# Patient Record
Sex: FEMALE | Race: BLACK OR AFRICAN AMERICAN | NOT HISPANIC OR LATINO | Employment: UNEMPLOYED | URBAN - METROPOLITAN AREA
[De-identification: names, ages, dates, MRNs, and addresses within clinical notes are randomized per-mention and may not be internally consistent; named-entity substitution may affect disease eponyms.]

---

## 2019-06-16 ENCOUNTER — HOSPITAL ENCOUNTER (INPATIENT)
Facility: HOSPITAL | Age: 59
LOS: 1 days | Discharge: HOME | DRG: 066 | End: 2019-06-17
Attending: EMERGENCY MEDICINE | Admitting: PSYCHIATRY & NEUROLOGY
Payer: MEDICAID

## 2019-06-16 DIAGNOSIS — I63.9 ACUTE ISCHEMIC STROKE: ICD-10-CM

## 2019-06-16 DIAGNOSIS — I63.311 CEREBROVASCULAR ACCIDENT (CVA) DUE TO THROMBOSIS OF RIGHT MIDDLE CEREBRAL ARTERY: Primary | ICD-10-CM

## 2019-06-16 PROBLEM — R29.810 FACIAL DROOP: Status: ACTIVE | Noted: 2019-06-16

## 2019-06-16 PROBLEM — I10 HYPERTENSION, ESSENTIAL: Status: ACTIVE | Noted: 2019-06-16

## 2019-06-16 PROBLEM — R47.1 DYSARTHRIA: Status: ACTIVE | Noted: 2019-06-16

## 2019-06-16 LAB
ALBUMIN SERPL BCP-MCNC: 4.1 G/DL (ref 3.5–5.2)
ALP SERPL-CCNC: 159 U/L (ref 55–135)
ALT SERPL W/O P-5'-P-CCNC: 17 U/L (ref 10–44)
ANION GAP SERPL CALC-SCNC: 14 MMOL/L (ref 8–16)
APTT BLDCRRT: 21.4 SEC (ref 21–32)
ASCENDING AORTA: 3.3 CM
AST SERPL-CCNC: 23 U/L (ref 10–40)
AV INDEX (PROSTH): 0.99
AV MEAN GRADIENT: 2.47 MMHG
AV PEAK GRADIENT: 4.67 MMHG
AV VALVE AREA: 2.98 CM2
AV VELOCITY RATIO: 0.9
BASOPHILS # BLD AUTO: 0.06 K/UL (ref 0–0.2)
BASOPHILS NFR BLD: 0.5 % (ref 0–1.9)
BILIRUB SERPL-MCNC: 0.3 MG/DL (ref 0.1–1)
BILIRUB UR QL STRIP: NEGATIVE
BSA FOR ECHO PROCEDURE: 1.97 M2
BUN SERPL-MCNC: 12 MG/DL (ref 6–20)
CALCIUM SERPL-MCNC: 10 MG/DL (ref 8.7–10.5)
CHLORIDE SERPL-SCNC: 101 MMOL/L (ref 95–110)
CHOLEST SERPL-MCNC: 240 MG/DL (ref 120–199)
CHOLEST/HDLC SERPL: 5 {RATIO} (ref 2–5)
CK MB SERPL-MCNC: 0.7 NG/ML (ref 0.1–6.5)
CK MB SERPL-RTO: 0.8 % (ref 0–5)
CK SERPL-CCNC: 86 U/L (ref 20–180)
CLARITY UR REFRACT.AUTO: CLEAR
CO2 SERPL-SCNC: 24 MMOL/L (ref 23–29)
COLOR UR AUTO: YELLOW
CREAT SERPL-MCNC: 0.7 MG/DL (ref 0.5–1.4)
CREAT SERPL-MCNC: 0.8 MG/DL (ref 0.5–1.4)
CV ECHO LV RWT: 0.39 CM
DIFFERENTIAL METHOD: ABNORMAL
DOP CALC AO PEAK VEL: 1.08 M/S
DOP CALC AO VTI: 17.62 CM
DOP CALC LVOT AREA: 3.02 CM2
DOP CALC LVOT DIAMETER: 1.96 CM
DOP CALC LVOT PEAK VEL: 0.97 M/S
DOP CALC LVOT STROKE VOLUME: 52.59 CM3
DOP CALCLVOT PEAK VEL VTI: 17.44 CM
E WAVE DECELERATION TIME: 206.33 MSEC
E/A RATIO: 1.11
E/E' RATIO: 8.21
ECHO LV POSTERIOR WALL: 0.78 CM (ref 0.6–1.1)
EOSINOPHIL # BLD AUTO: 0.4 K/UL (ref 0–0.5)
EOSINOPHIL NFR BLD: 3.3 % (ref 0–8)
ERYTHROCYTE [DISTWIDTH] IN BLOOD BY AUTOMATED COUNT: 14.4 % (ref 11.5–14.5)
EST. GFR  (AFRICAN AMERICAN): >60 ML/MIN/1.73 M^2
EST. GFR  (NON AFRICAN AMERICAN): >60 ML/MIN/1.73 M^2
ESTIMATED AVG GLUCOSE: 169 MG/DL (ref 68–131)
FRACTIONAL SHORTENING: 34 % (ref 28–44)
GLUCOSE SERPL-MCNC: 154 MG/DL (ref 70–110)
GLUCOSE UR QL STRIP: NEGATIVE
HBA1C MFR BLD HPLC: 7.5 % (ref 4–5.6)
HCT VFR BLD AUTO: 38.9 % (ref 37–48.5)
HDLC SERPL-MCNC: 48 MG/DL (ref 40–75)
HDLC SERPL: 20 % (ref 20–50)
HGB BLD-MCNC: 12.5 G/DL (ref 12–16)
HGB UR QL STRIP: NEGATIVE
IMM GRANULOCYTES # BLD AUTO: 0.02 K/UL (ref 0–0.04)
IMM GRANULOCYTES NFR BLD AUTO: 0.2 % (ref 0–0.5)
INR PPP: 0.9 (ref 0.8–1.2)
INR PPP: 1 (ref 0.8–1.2)
INTERVENTRICULAR SEPTUM: 0.74 CM (ref 0.6–1.1)
IVRT: 0.1 MSEC
KETONES UR QL STRIP: NEGATIVE
LA MAJOR: 5.28 CM
LA MINOR: 5.3 CM
LA WIDTH: 3.82 CM
LDLC SERPL CALC-MCNC: 149.4 MG/DL (ref 63–159)
LEFT ATRIUM SIZE: 3.42 CM
LEFT ATRIUM VOLUME INDEX: 30.7 ML/M2
LEFT ATRIUM VOLUME: 58.74 CM3
LEFT INTERNAL DIMENSION IN SYSTOLE: 2.63 CM (ref 2.1–4)
LEFT VENTRICLE DIASTOLIC VOLUME INDEX: 35.95 ML/M2
LEFT VENTRICLE DIASTOLIC VOLUME: 68.8 ML
LEFT VENTRICLE MASS INDEX: 45 G/M2
LEFT VENTRICLE SYSTOLIC VOLUME INDEX: 13.2 ML/M2
LEFT VENTRICLE SYSTOLIC VOLUME: 25.25 ML
LEFT VENTRICULAR INTERNAL DIMENSION IN DIASTOLE: 3.97 CM (ref 3.5–6)
LEFT VENTRICULAR MASS: 86.21 G
LEUKOCYTE ESTERASE UR QL STRIP: NEGATIVE
LV LATERAL E/E' RATIO: 7.8
LV SEPTAL E/E' RATIO: 8.67
LYMPHOCYTES # BLD AUTO: 6.2 K/UL (ref 1–4.8)
LYMPHOCYTES NFR BLD: 54.7 % (ref 18–48)
MCH RBC QN AUTO: 26.9 PG (ref 27–31)
MCHC RBC AUTO-ENTMCNC: 32.1 G/DL (ref 32–36)
MCV RBC AUTO: 84 FL (ref 82–98)
MONOCYTES # BLD AUTO: 0.9 K/UL (ref 0.3–1)
MONOCYTES NFR BLD: 7.7 % (ref 4–15)
MV PEAK A VEL: 0.7 M/S
MV PEAK E VEL: 0.78 M/S
NEUTROPHILS # BLD AUTO: 3.8 K/UL (ref 1.8–7.7)
NEUTROPHILS NFR BLD: 33.6 % (ref 38–73)
NITRITE UR QL STRIP: NEGATIVE
NONHDLC SERPL-MCNC: 192 MG/DL
NRBC BLD-RTO: 0 /100 WBC
PH UR STRIP: 7 [PH] (ref 5–8)
PISA TR MAX VEL: 1.69 M/S
PLATELET # BLD AUTO: 342 K/UL (ref 150–350)
PMV BLD AUTO: 11.3 FL (ref 9.2–12.9)
POC PTINR: 0.9 (ref 0.9–1.2)
POC PTWBT: 11.4 SEC (ref 9.7–14.3)
POCT GLUCOSE: 119 MG/DL (ref 70–110)
POCT GLUCOSE: 119 MG/DL (ref 70–110)
POCT GLUCOSE: 150 MG/DL (ref 70–110)
POTASSIUM SERPL-SCNC: 3.9 MMOL/L (ref 3.5–5.1)
PROT SERPL-MCNC: 8.8 G/DL (ref 6–8.4)
PROT UR QL STRIP: NEGATIVE
PROTHROMBIN TIME: 10.1 SEC (ref 9–12.5)
PROTHROMBIN TIME: 9.9 SEC (ref 9–12.5)
PULM VEIN S/D RATIO: 1.79
PV PEAK D VEL: 0.24 M/S
PV PEAK S VEL: 0.43 M/S
RA MAJOR: 4.4 CM
RA PRESSURE: 3 MMHG
RA WIDTH: 4.11 CM
RBC # BLD AUTO: 4.65 M/UL (ref 4–5.4)
RV TISSUE DOPPLER FREE WALL SYSTOLIC VELOCITY 1 (APICAL 4 CHAMBER VIEW): 12.7 M/S
SAMPLE: NORMAL
SAMPLE: NORMAL
SINUS: 2.95 CM
SODIUM SERPL-SCNC: 139 MMOL/L (ref 136–145)
SP GR UR STRIP: 1.01 (ref 1–1.03)
STJ: 2.88 CM
TDI LATERAL: 0.1
TDI SEPTAL: 0.09
TDI: 0.1
TR MAX PG: 11.42 MMHG
TRICUSPID ANNULAR PLANE SYSTOLIC EXCURSION: 2.18 CM
TRIGL SERPL-MCNC: 213 MG/DL (ref 30–150)
TROPONIN I SERPL DL<=0.01 NG/ML-MCNC: <0.006 NG/ML (ref 0–0.03)
TSH SERPL DL<=0.005 MIU/L-ACNC: 2.86 UIU/ML (ref 0.4–4)
TV REST PULMONARY ARTERY PRESSURE: 14 MMHG
URN SPEC COLLECT METH UR: NORMAL
WBC # BLD AUTO: 11.37 K/UL (ref 3.9–12.7)

## 2019-06-16 PROCEDURE — 99223 PR INITIAL HOSPITAL CARE,LEVL III: ICD-10-PCS | Mod: ,,, | Performed by: PSYCHIATRY & NEUROLOGY

## 2019-06-16 PROCEDURE — 93010 ELECTROCARDIOGRAM REPORT: CPT | Mod: ,,, | Performed by: INTERNAL MEDICINE

## 2019-06-16 PROCEDURE — 20600001 HC STEP DOWN PRIVATE ROOM

## 2019-06-16 PROCEDURE — 85610 PROTHROMBIN TIME: CPT

## 2019-06-16 PROCEDURE — 80053 COMPREHEN METABOLIC PANEL: CPT

## 2019-06-16 PROCEDURE — 82550 ASSAY OF CK (CPK): CPT

## 2019-06-16 PROCEDURE — 82565 ASSAY OF CREATININE: CPT

## 2019-06-16 PROCEDURE — 84484 ASSAY OF TROPONIN QUANT: CPT

## 2019-06-16 PROCEDURE — 63600175 PHARM REV CODE 636 W HCPCS: Performed by: NURSE PRACTITIONER

## 2019-06-16 PROCEDURE — 96374 THER/PROPH/DIAG INJ IV PUSH: CPT

## 2019-06-16 PROCEDURE — 99223 1ST HOSP IP/OBS HIGH 75: CPT | Mod: ,,, | Performed by: PSYCHIATRY & NEUROLOGY

## 2019-06-16 PROCEDURE — 81003 URINALYSIS AUTO W/O SCOPE: CPT

## 2019-06-16 PROCEDURE — 93010 EKG 12-LEAD: ICD-10-PCS | Mod: ,,, | Performed by: INTERNAL MEDICINE

## 2019-06-16 PROCEDURE — 25500020 PHARM REV CODE 255: Performed by: EMERGENCY MEDICINE

## 2019-06-16 PROCEDURE — 84443 ASSAY THYROID STIM HORMONE: CPT

## 2019-06-16 PROCEDURE — 99291 PR CRITICAL CARE, E/M 30-74 MINUTES: ICD-10-PCS | Mod: ,,, | Performed by: EMERGENCY MEDICINE

## 2019-06-16 PROCEDURE — 25000003 PHARM REV CODE 250: Performed by: PHYSICIAN ASSISTANT

## 2019-06-16 PROCEDURE — 25000003 PHARM REV CODE 250: Performed by: NURSE PRACTITIONER

## 2019-06-16 PROCEDURE — 99291 CRITICAL CARE FIRST HOUR: CPT | Mod: ,,, | Performed by: EMERGENCY MEDICINE

## 2019-06-16 PROCEDURE — 82553 CREATINE MB FRACTION: CPT

## 2019-06-16 PROCEDURE — 85610 PROTHROMBIN TIME: CPT | Mod: 91

## 2019-06-16 PROCEDURE — 99291 CRITICAL CARE FIRST HOUR: CPT | Mod: 25

## 2019-06-16 PROCEDURE — 80061 LIPID PANEL: CPT

## 2019-06-16 PROCEDURE — 83036 HEMOGLOBIN GLYCOSYLATED A1C: CPT

## 2019-06-16 PROCEDURE — 85025 COMPLETE CBC W/AUTO DIFF WBC: CPT

## 2019-06-16 PROCEDURE — 85730 THROMBOPLASTIN TIME PARTIAL: CPT

## 2019-06-16 PROCEDURE — 92610 EVALUATE SWALLOWING FUNCTION: CPT

## 2019-06-16 PROCEDURE — 82803 BLOOD GASES ANY COMBINATION: CPT

## 2019-06-16 PROCEDURE — 99900035 HC TECH TIME PER 15 MIN (STAT)

## 2019-06-16 PROCEDURE — 93005 ELECTROCARDIOGRAM TRACING: CPT

## 2019-06-16 RX ORDER — ASPIRIN 81 MG/1
81 TABLET ORAL DAILY
Status: DISCONTINUED | OUTPATIENT
Start: 2019-06-16 | End: 2019-06-17 | Stop reason: HOSPADM

## 2019-06-16 RX ORDER — ACETAMINOPHEN 325 MG/1
650 TABLET ORAL EVERY 6 HOURS PRN
Status: DISCONTINUED | OUTPATIENT
Start: 2019-06-16 | End: 2019-06-17 | Stop reason: HOSPADM

## 2019-06-16 RX ORDER — AMLODIPINE BESYLATE 10 MG/1
10 TABLET ORAL DAILY
COMMUNITY

## 2019-06-16 RX ORDER — SODIUM CHLORIDE 0.9 % (FLUSH) 0.9 %
10 SYRINGE (ML) INJECTION
Status: DISCONTINUED | OUTPATIENT
Start: 2019-06-16 | End: 2019-06-17 | Stop reason: HOSPADM

## 2019-06-16 RX ORDER — AMLODIPINE BESYLATE 10 MG/1
10 TABLET ORAL DAILY
Status: DISCONTINUED | OUTPATIENT
Start: 2019-06-16 | End: 2019-06-17 | Stop reason: HOSPADM

## 2019-06-16 RX ORDER — POLYETHYLENE GLYCOL 3350 17 G/17G
17 POWDER, FOR SOLUTION ORAL DAILY PRN
Status: DISCONTINUED | OUTPATIENT
Start: 2019-06-16 | End: 2019-06-17 | Stop reason: HOSPADM

## 2019-06-16 RX ORDER — MIDAZOLAM HYDROCHLORIDE 1 MG/ML
1 INJECTION INTRAMUSCULAR; INTRAVENOUS
Status: COMPLETED | OUTPATIENT
Start: 2019-06-16 | End: 2019-06-16

## 2019-06-16 RX ORDER — CLOPIDOGREL BISULFATE 75 MG/1
75 TABLET ORAL DAILY
Status: DISCONTINUED | OUTPATIENT
Start: 2019-06-16 | End: 2019-06-17 | Stop reason: HOSPADM

## 2019-06-16 RX ORDER — ONDANSETRON 8 MG/1
8 TABLET, ORALLY DISINTEGRATING ORAL EVERY 8 HOURS PRN
Status: DISCONTINUED | OUTPATIENT
Start: 2019-06-16 | End: 2019-06-17 | Stop reason: HOSPADM

## 2019-06-16 RX ORDER — ATORVASTATIN CALCIUM 20 MG/1
40 TABLET, FILM COATED ORAL DAILY
Status: DISCONTINUED | OUTPATIENT
Start: 2019-06-16 | End: 2019-06-17 | Stop reason: HOSPADM

## 2019-06-16 RX ORDER — ASPIRIN 81 MG/1
81 TABLET ORAL DAILY
COMMUNITY

## 2019-06-16 RX ORDER — HEPARIN SODIUM 5000 [USP'U]/ML
5000 INJECTION, SOLUTION INTRAVENOUS; SUBCUTANEOUS EVERY 8 HOURS
Status: DISCONTINUED | OUTPATIENT
Start: 2019-06-16 | End: 2019-06-17 | Stop reason: HOSPADM

## 2019-06-16 RX ORDER — LABETALOL HYDROCHLORIDE 5 MG/ML
10 INJECTION, SOLUTION INTRAVENOUS
Status: DISCONTINUED | OUTPATIENT
Start: 2019-06-16 | End: 2019-06-17 | Stop reason: HOSPADM

## 2019-06-16 RX ADMIN — ASPIRIN 81 MG: 81 TABLET, COATED ORAL at 08:06

## 2019-06-16 RX ADMIN — HEPARIN SODIUM 5000 UNITS: 5000 INJECTION, SOLUTION INTRAVENOUS; SUBCUTANEOUS at 10:06

## 2019-06-16 RX ADMIN — AMLODIPINE BESYLATE 10 MG: 10 TABLET ORAL at 08:06

## 2019-06-16 RX ADMIN — CLOPIDOGREL 75 MG: 75 TABLET, FILM COATED ORAL at 04:06

## 2019-06-16 RX ADMIN — HEPARIN SODIUM 5000 UNITS: 5000 INJECTION, SOLUTION INTRAVENOUS; SUBCUTANEOUS at 05:06

## 2019-06-16 RX ADMIN — ATORVASTATIN CALCIUM 40 MG: 20 TABLET, FILM COATED ORAL at 08:06

## 2019-06-16 RX ADMIN — IOHEXOL 100 ML: 350 INJECTION, SOLUTION INTRAVENOUS at 01:06

## 2019-06-16 RX ADMIN — HEPARIN SODIUM 5000 UNITS: 5000 INJECTION, SOLUTION INTRAVENOUS; SUBCUTANEOUS at 02:06

## 2019-06-16 RX ADMIN — MIDAZOLAM HYDROCHLORIDE 1 MG: 1 INJECTION, SOLUTION INTRAMUSCULAR; INTRAVENOUS at 03:06

## 2019-06-16 NOTE — ED NOTES
Telemetry Verification   Patient placed on Telemetry Box  Verified with War Room  Box # [id number]   Monitor Tech    Rate 96   Rhythm Normal Sinus

## 2019-06-16 NOTE — SUBJECTIVE & OBJECTIVE
Past Medical History:   Diagnosis Date    Hypertension      No past surgical history on file.  No family history on file.  Social History     Tobacco Use    Smoking status: Not on file   Substance Use Topics    Alcohol use: Not on file    Drug use: Not on file     Review of patient's allergies indicates:   Allergen Reactions    Latex, natural rubber        Medications: I have reviewed the current medication administration record.      (Not in a hospital admission)    Review of Systems   Constitutional: Negative for chills and fever.   HENT: Negative for ear discharge and ear pain.    Eyes: Negative for pain and itching.   Respiratory: Negative for cough and shortness of breath.    Cardiovascular: Negative for chest pain and leg swelling.   Gastrointestinal: Negative for abdominal distention and abdominal pain.   Genitourinary: Negative for dyspareunia and dysuria.   Musculoskeletal: Positive for back pain. Negative for arthralgias.   Skin: Negative for rash and wound.   Neurological: Positive for facial asymmetry and speech difficulty.     Objective:     Vital Signs (Most Recent):  Temp: 98.5 °F (36.9 °C) (06/16/19 0102)  Pulse: 102 (06/16/19 0317)  Resp: 16 (06/16/19 0113)  BP: 111/71 (06/16/19 0317)  SpO2: 95 % (06/16/19 0317)    Vital Signs Range (Last 24H):  Temp:  [98.5 °F (36.9 °C)]   Pulse:  [102-137]   Resp:  [12-16]   BP: (111-183)/(71-93)   SpO2:  [95 %-99 %]     Physical Exam   Constitutional: She is oriented to person, place, and time. She appears well-developed and well-nourished.   HENT:   Head: Normocephalic and atraumatic.   Eyes: Pupils are equal, round, and reactive to light. EOM are normal.   Neck: Normal range of motion.   Cardiovascular: Normal rate.   Pulmonary/Chest: Effort normal.   Abdominal: Soft.   Neurological: She is alert and oriented to person, place, and time.   Dysarthria and facial droop   Skin: Skin is warm and dry.   Nursing note and vitals reviewed.      Neurological  Exam:   LOC: alert  Attention Span: Good   Language: No aphasia  Articulation: Dysarthria  Orientation: Person, Place, Time   Visual Fields: Full  EOM (CN III, IV, VI): Full/intact  Pupils (CN II, III): PERRL  Facial Sensation (CN V): Normal  Facial Movement (CN VII): Lower facial weakness on the Left  Gag Reflex: present  Reflexes: flexor plantar responses bilaterally  Motor: Arm left  Normal 5/5  Leg left  Normal 5/5  Arm right  Normal 5/5  Leg right Normal 5/5  Cebellar: No evidence of appendicular or axial ataxia  Sensation: Intact to light touch, temperature and vibration  Tone: Normal tone throughout      Laboratory:  CMP:   Recent Labs   Lab 06/16/19  0123   CALCIUM 10.0   ALBUMIN 4.1   PROT 8.8*      K 3.9   CO2 24      BUN 12   CREATININE 0.8   ALKPHOS 159*   ALT 17   AST 23   BILITOT 0.3     CBC:   Recent Labs   Lab 06/16/19 0123   WBC 11.37   RBC 4.65   HGB 12.5   HCT 38.9      MCV 84   MCH 26.9*   MCHC 32.1     Lipid Panel:   Recent Labs   Lab 06/16/19  0123   CHOL 240*   LDLCALC 149.4   HDL 48   TRIG 213*     Coagulation:   Recent Labs   Lab 06/16/19 0123   INR 1.0     Hgb A1C: No results for input(s): HGBA1C in the last 168 hours.  TSH:   Recent Labs   Lab 06/16/19  0123   TSH 2.859       Diagnostic Results:      Brain imaging:  CT head w/o contrast 6-16-19 results:  No CT evidence of acute intracranial abnormality.    Vessel Imaging:  CTA head and neck multiphase 6-16-19 results:  No evidence of an intracranial high-grade stenosis or occlusion.  Consider MRI if there is persistent concern for acute infarct.    Cardiac Evaluation:   EKG 6-16-19 results:  Sinus tachycardia  Nonspecific ST abnormality  Abnormal ECG  When compared with ECG of 28-APR-2007 09:50,  Vent. rate has increased BY 48 BPM  ST now depressed in Inferior leads  Nonspecific T wave abnormality no longer evident in Lateral leads

## 2019-06-16 NOTE — HPI
57 y/o femkim who was up drying her granddaughters hair when she called her son around 2330 and did not say anything to him. He called back and could not understand her. Family noticed facial droop and dysarthria. Patient brought to ED for evaluation.  Upon examination patient only neuro deficit is facial droop and dysarthria. NIHSS 2. CT scan no acute process seen. CTA head and neck multiphase no LVO seen. NO TPA as low NIHSS and no IR.  Risk factors HTN, obesity

## 2019-06-16 NOTE — H&P
[facility]  Vascular Neurology  Comprehensive Stroke Center  History & Physical    Inpatient consult to Vascular Neurology  Consult performed by: Martha Thomas NP  Consult ordered by: Annetta Allen PA-C  Reason for consult: stroke        Assessment/Plan:     Patient is a 58 y.o. year old female with:    * Acute ischemic stroke  59 y/o female with dysarthria and facial droop, NO TPA or IR    Antithrombotics: aspirin 81 mg daily    Statins:Lipitor 40 mg daily    Aggressive risk factor modification: HTN, Diet, Exercise, Obesity     Rehab efforts: The patient has been evaluated by a stroke team provider and the therapy needs have been fully considered based off the presenting complaints and exam findings. The following therapy evaluations are needed: SLP evaluate and treat    Diagnostics ordered/pending: HgbA1C to assess blood glucose levels, MRI head without contrast to assess brain parenchyma, TTE to assess cardiac function/status     VTE prophylaxis: Heparin 5000 units SQ every 8 hours    BP parameters: Infarct: No intervention, SBP <220        Hypertension, essential  Stroke risk factor  SBP <220  Will restart home Norvasc    Dysarthria  Due to stroke  Aggressive therapy    Facial droop  Due to stroke  Aggressive therapy        STROKE DOCUMENTATION     Acute Stroke Times   Last Known Normal Date: 06/15/19  Last Known Normal Time: 2330  Symptom Onset Date: 06/15/19  Symptom Onset Time: 2330  Stroke Team Called Date: 06/16/19  Stroke Team Called Time: 0105  Stroke Team Arrival Date: 06/16/19  Stroke Team Arrival Time: 0110  CT Interpretation Time: 0126    NIH Scale:  1a. Level of Consciousness: 0-->Alert, keenly responsive  1b. LOC Questions: 0-->Answers both questions correctly  1c. LOC Commands: 0-->Performs both tasks correctly  2. Best Gaze: 0-->Normal  3. Visual: 0-->No visual loss  4. Facial Palsy: 1-->Minor paralysis (flattened nasolabial fold, asymmetry on smiling)  5a. Motor  Arm, Left: 0-->No drift, limb holds 90 (or 45) degrees for full 10 secs  5b. Motor Arm, Right: 0-->No drift, limb holds 90 (or 45) degrees for full 10 secs  6a. Motor Leg, Left: 0-->No drift, leg holds 30 degree position for full 5 secs  6b. Motor Leg, Right: 0-->No drift, leg holds 30 degree position for full 5 secs  7. Limb Ataxia: 0-->Absent  8. Sensory: 0-->Normal, no sensory loss  9. Best Language: 0-->No aphasia, normal  10. Dysarthria: 1-->Mild-to-moderate dysarthria, patient slurs at least some words and, at worst, can be understood with some difficulty  11. Extinction and Inattention (formerly Neglect): 0-->No abnormality  Total (NIH Stroke Scale): 2     Modified Jennifer Score: 0  [place] Coma Scale:15   ABCD2 Score:    [id number] Score:   HAS -BLED Score:   ICH Score:   Hunt & Brennan Classification:      Thrombolysis Candidate? No, Minimal symptoms    Delays to Thrombolysis?  No    Interventional Revascularization Candidate?   Is the patient eligible for mechanical endovascular reperfusion (URSZULA)?  No; No large vessel occlusion    Hemorrhagic change of an Ischemic Stroke: Does this patient have an ischemic stroke with hemorrhagic changes? No         Subjective:     History of Present Illness:  59 y/o femkim who was up drying her granddaughters hair when she called her son around 2330 and did not say anything to him. He called back and could not understand her. Family noticed facial droop and dysarthria. Patient brought to ED for evaluation.  Upon examination patient only neuro deficit is facial droop and dysarthria. NIHSS 2. CT scan no acute process seen. CTA head and neck multiphase no LVO seen. NO TPA as low NIHSS and no IR.  Risk factors HTN, obesity        Past Medical History:   Diagnosis Date    Hypertension      No past surgical history on file.  No family history on file.  Social History     Tobacco Use    Smoking status: Not on file   Substance Use Topics    Alcohol use: Not on file    Drug  use: Not on file     Review of patient's allergies indicates:   Allergen Reactions    Latex, natural rubber        Medications: I have reviewed the current medication administration record.      (Not in a hospital admission)    Review of Systems   Constitutional: Negative for chills and fever.   HENT: Negative for ear discharge and ear pain.    Eyes: Negative for pain and itching.   Respiratory: Negative for cough and shortness of breath.    Cardiovascular: Negative for chest pain and leg swelling.   Gastrointestinal: Negative for abdominal distention and abdominal pain.   Genitourinary: Negative for dyspareunia and dysuria.   Musculoskeletal: Positive for back pain. Negative for arthralgias.   Skin: Negative for rash and wound.   Neurological: Positive for facial asymmetry and speech difficulty.     Objective:     Vital Signs (Most Recent):  Temp: 98.5 °F (36.9 °C) (06/16/19 0102)  Pulse: 102 (06/16/19 0317)  Resp: 16 (06/16/19 0113)  BP: 111/71 (06/16/19 0317)  SpO2: 95 % (06/16/19 0317)    Vital Signs Range (Last 24H):  Temp:  [98.5 °F (36.9 °C)]   Pulse:  [102-137]   Resp:  [12-16]   BP: (111-183)/(71-93)   SpO2:  [95 %-99 %]     Physical Exam   Constitutional: She is oriented to person, place, and time. She appears well-developed and well-nourished.   HENT:   Head: Normocephalic and atraumatic.   Eyes: Pupils are equal, round, and reactive to light. EOM are normal.   Neck: Normal range of motion.   Cardiovascular: Normal rate.   Pulmonary/Chest: Effort normal.   Abdominal: Soft.   Neurological: She is alert and oriented to person, place, and time.   Dysarthria and facial droop   Skin: Skin is warm and dry.   Nursing note and vitals reviewed.      Neurological Exam:   LOC: alert  Attention Span: Good   Language: No aphasia  Articulation: Dysarthria  Orientation: Person, Place, Time   Visual Fields: Full  EOM (CN III, IV, VI): Full/intact  Pupils (CN II, III): PERRL  Facial Sensation (CN V): Normal  Facial  Movement (CN VII): Lower facial weakness on the Left  Gag Reflex: present  Reflexes: flexor plantar responses bilaterally  Motor: Arm left  Normal 5/5  Leg left  Normal 5/5  Arm right  Normal 5/5  Leg right Normal 5/5  Cebellar: No evidence of appendicular or axial ataxia  Sensation: Intact to light touch, temperature and vibration  Tone: Normal tone throughout      Laboratory:  CMP:   Recent Labs   Lab 06/16/19  0123   CALCIUM 10.0   ALBUMIN 4.1   PROT 8.8*      K 3.9   CO2 24      BUN 12   CREATININE 0.8   ALKPHOS 159*   ALT 17   AST 23   BILITOT 0.3     CBC:   Recent Labs   Lab 06/16/19 0123   WBC 11.37   RBC 4.65   HGB 12.5   HCT 38.9      MCV 84   MCH 26.9*   MCHC 32.1     Lipid Panel:   Recent Labs   Lab 06/16/19  0123   CHOL 240*   LDLCALC 149.4   HDL 48   TRIG 213*     Coagulation:   Recent Labs   Lab 06/16/19 0123   INR 1.0     Hgb A1C: No results for input(s): HGBA1C in the last 168 hours.  TSH:   Recent Labs   Lab 06/16/19  0123   TSH 2.859       Diagnostic Results:      Brain imaging:  CT head w/o contrast 6-16-19 results:  No CT evidence of acute intracranial abnormality.    Vessel Imaging:  CTA head and neck multiphase 6-16-19 results:  No evidence of an intracranial high-grade stenosis or occlusion.  Consider MRI if there is persistent concern for acute infarct.    Cardiac Evaluation:   EKG 6-16-19 results:  Sinus tachycardia  Nonspecific ST abnormality  Abnormal ECG  When compared with ECG of 28-APR-2007 09:50,  Vent. rate has increased BY 48 BPM  ST now depressed in Inferior leads  Nonspecific T wave abnormality no longer evident in Lateral leads        Martha Thomas NP  [facility]  Department of Vascular Neurology   [facility]        CRF (chronic renal failure), stage 5  05/13/2017    Active  Nicholas Morin

## 2019-06-16 NOTE — ASSESSMENT & PLAN NOTE
59 y/o female with dysarthria and facial droop, NO TPA or IR    Antithrombotics: aspirin 81 mg daily    Statins:Lipitor 40 mg daily    Aggressive risk factor modification: HTN, Diet, Exercise, Obesity     Rehab efforts: The patient has been evaluated by a stroke team provider and the therapy needs have been fully considered based off the presenting complaints and exam findings. The following therapy evaluations are needed: SLP evaluate and treat    Diagnostics ordered/pending: HgbA1C to assess blood glucose levels, MRI head without contrast to assess brain parenchyma, TTE to assess cardiac function/status     VTE prophylaxis: Heparin 5000 units SQ every 8 hours    BP parameters: Infarct: No intervention, SBP <220

## 2019-06-16 NOTE — PLAN OF CARE
Problem: SLP Goal  Goal: SLP Goal  Pt seen for clinical swallow assessment this date. Pt with evident left sided orofacial weakness. Pt with poor oral constrainment and control appearing safest for a diet of nectar thick liquids and purees solids at this time with meds crushed in puree.     Daiana Kuhn MS, CCC-SLP  Speech Language Pathologist  Pager: [phone]  Date 6/16/2019

## 2019-06-16 NOTE — ED PROVIDER NOTES
"Encounter Date: 6/16/2019       History     Chief Complaint   Patient presents with    Aphasia     Pt reports left sided facial droop, slurred speech, "funny feeling" to left side of body X20 min.      Patient is a 58 year old female with PMHX of HTN and hx of TIA. She presents to the ED for speech difficulty. She reports having slurred speech onset approximately 0:00. Reports associated left facial droop. Denies hx of anticoagulation. She denies HA, visual disturbances, paresthesias, gait abnormalities, or motor weakness. She denies fever,chills, nausea, vomiting, sob, chest pain, abd pain, dysuria, diarrhea, or constipation. She is a non smoker and denies alcohol use.    The history is provided by the patient. No  was used.     Review of patient's allergies indicates:   Allergen Reactions    Latex, natural rubber      Past Medical History:   Diagnosis Date    Hypertension      No past surgical history on file.  No family history on file.  Social History     Tobacco Use    Smoking status: Not on file   Substance Use Topics    Alcohol use: Not on file    Drug use: Not on file     Review of Systems   Constitutional: Negative for fever.   HENT: Negative for sore throat.    Respiratory: Negative for shortness of breath.    Cardiovascular: Negative for chest pain.   Gastrointestinal: Negative for abdominal pain, nausea and vomiting.   Genitourinary: Negative for dysuria.   Musculoskeletal: Negative for back pain.   Skin: Negative for rash.   Neurological: Positive for facial asymmetry and speech difficulty. Negative for weakness.   Hematological: Does not bruise/bleed easily.       Physical Exam     Initial Vitals [06/16/19 0102]   BP Pulse Resp Temp SpO2   (!) 183/93 (!) 137 12 98.5 °F (36.9 °C) 99 %      MAP       --         Physical Exam    Vitals reviewed.  Constitutional: She appears well-developed and well-nourished. No distress.   HENT:   Head: Normocephalic.   Eyes: Conjunctivae and EOM " are normal. Pupils are equal, round, and reactive to light.   Neck: Normal range of motion.   Cardiovascular: Regular rhythm. Tachycardia present.    No murmur heard.  Pulmonary/Chest: Breath sounds normal. No respiratory distress. She has no wheezes. She has no rales.   Abdominal: Soft. Bowel sounds are normal. She exhibits no distension. There is no tenderness.   Musculoskeletal: Normal range of motion.   Neurological: She is alert and oriented to person, place, and time. She has normal strength. No sensory deficit. Coordination and gait normal. GCS eye subscore is 4. GCS verbal subscore is 5. GCS motor subscore is 6.   Motor strength of b/l UE and LE 5/5. Finger to nose negative. Heel to shin negative. Pronator drift negative. Left facial droop or asymmetry. Speech is dysarthric. Tongue protrudes midline with no fasciculations.   Skin: Skin is warm and dry. No erythema.         ED Course   Critical Care  Date/Time: 6/16/2019 3:20 AM  Performed by: Annetta Allen PA-C  Authorized by: Mike Callejas III, MD   Direct patient critical care time: 15 minutes  Additional history critical care time: 5 minutes  Ordering / reviewing critical care time: 5 minutes  Documentation critical care time: 5 minutes  Consulting other physicians critical care time: 5 minutes  Total critical care time (exclusive of procedural time) : 35 minutes  Critical care was necessary to treat or prevent imminent or life-threatening deterioration of the following conditions: CNS failure or compromise and circulatory failure.  Critical care was time spent personally by me on the following activities: examination of patient, obtaining history from patient or surrogate, ordering and performing treatments and interventions, ordering and review of laboratory studies, ordering and review of radiographic studies, pulse oximetry, review of old charts and discussions with consultants.        Labs Reviewed   CBC W/ AUTO DIFFERENTIAL - Abnormal;  "Notable for the following components:       Result Value    Mean Corpuscular Hemoglobin 26.9 (*)     Lymph # 6.2 (*)     Gran% 33.6 (*)     Lymph% 54.7 (*)     All other components within normal limits   COMPREHENSIVE METABOLIC PANEL - Abnormal; Notable for the following components:    Glucose 154 (*)     Total Protein 8.8 (*)     Alkaline Phosphatase 159 (*)     All other components within normal limits   LIPID PANEL - Abnormal; Notable for the following components:    Cholesterol 240 (*)     Triglycerides 213 (*)     All other components within normal limits   POCT GLUCOSE - Abnormal; Notable for the following components:    POCT Glucose 150 (*)     All other components within normal limits   PROTIME-INR   TSH   HEMOGLOBIN A1C   TROPONIN I   CK-MB   APTT   PROTIME-INR   URINALYSIS   POCT GLUCOSE, HAND-HELD DEVICE   ISTAT PROCEDURE   ISTAT CREATININE   ISTAT CHEM8          Imaging Results          X-Ray Chest AP Portable (Final result)  Result time 06/16/19 02:33:49    Final result by Dano Pacheco MD (06/16/19 02:33:49)                 Impression:      No acute cardiopulmonary finding identified on this single view.      Electronically signed by: Dano Pacheco MD  Date:    06/16/2019  Time:    02:33             Narrative:    EXAMINATION:  XR CHEST AP PORTABLE    CLINICAL HISTORY:  Provided history is "Stroke;  ".    TECHNIQUE:  One view of the chest.    COMPARISON:  05/21/2012.    FINDINGS:  Cardiac wires overlie the chest.  Cardiac silhouette is not enlarged.  Scattered calcified granulomata are present.  No focal consolidation.  No sizable pleural effusion.  No pneumothorax.                               CTA STROKE MULTI-PHASE (Final result)  Result time 06/16/19 03:00:21    Final result by Dano Pacheco MD (06/16/19 03:00:21)                 Impression:      No evidence of an intracranial high-grade stenosis or occlusion.  Consider MRI if there is persistent concern for acute " infarct.    Electronically signed by resident: Tisha Drummond  Date:    06/16/2019  Time:    02:02    Electronically signed by: Dano Pacheco MD  Date:    06/16/2019  Time:    03:00             Narrative:    EXAMINATION:  CTA STROKE MULTI-PHASE    CLINICAL HISTORY:  Stroke;    TECHNIQUE:  Axial CT images obtained throughout the region of the head after the administration of intravenous contrast.  CT angiogram was performed from through the cervical and intracranial vasculature during the IV bolus administration of 75mL of Omnipaque 350.  Multiplanar MPR and MIP reformats were performed.    COMPARISON:  CT head 06/16/2019    FINDINGS:  Please refer to CT head performed just prior to this exam for a better evaluation of the brain parenchyma.      CTA:    The aortic arch maintains a normal branching pattern.    The common and internal carotid arteries are normal in course and caliber. No significant stenosis in either carotid bifurcation.    The vertebral origins are patent. The cervical vertebral arteries are normal in course and caliber. Vertebrobasilar system is within normal limits without focal abnormality.    The anterior, middle, and posterior cerebral arteries are within normal limits, without evidence of significant stenosis, focal occlusion, or intracranial aneurysm formation.                               CT Head Without Contrast (Final result)  Result time 06/16/19 01:26:56    Final result by Dano Pacheco MD (06/16/19 01:26:56)                 Impression:      No CT evidence of acute intracranial abnormality.      Electronically signed by: Dano Pacheco MD  Date:    06/16/2019  Time:    01:26             Narrative:    EXAMINATION:  CT HEAD WITHOUT CONTRAST    CLINICAL HISTORY:  Stroke;    TECHNIQUE:  Low dose axial images were obtained through the head.  Coronal and sagittal reformations were also performed. Contrast was not administered.    COMPARISON:  None.    FINDINGS:  No evidence of acute  territorial infarct, hemorrhage, mass effect, or midline shift.    Ventricles are normal in size and configuration.    No displaced calvarial fracture.    Visualized paranasal sinuses and mastoid air cells are clear.                                 Medical Decision Making:   History:   Old Medical Records: I decided to obtain old medical records.  Clinical Tests:   Lab Tests: Ordered and Reviewed  Radiological Study: Ordered and Reviewed  Other:   I have discussed this case with another health care provider.       <> Summary of the Discussion: Vascular neurology.        APC / Resident Notes:   Patient is a 58 year old female presents to the ED for emergent evaluation of speech difficulty.     Stroke code initiated from triage. Will order labs and imaging. Will consult vascular neurology, awaiting recs. Will continue to monitor.     Differential diagnoses include, but are not limited to: CVA, TIA, atypical migraine, cardiac arrhythmia, or electrolyte imbalance.     No leukocytosis. Hemodynamically stable.  CXR found to have No acute cardiopulmonary process. CT head found to have no evidence of acute intracranial abnormality.        Appreciate vascular neurology consult. They will obtain CTA multiphase for further evaluation. CTA multiphase found to have No evidence of an intracranial high-grade stenosis or occlusion.     Vascular neurology will admit patient to their service for further management.     I spent 35 minutes of critical care time with this patient. This does not include time spent on separately reported billable procedures.     I have discussed and reviewed with my supervising physician.         Attending Attestation:             Attending ED Notes:   I evaluated this patient with the midlevel provider.  I had a face-to-face encounter during which I obtained history from the patient performed a physical examination. I was involved in all aspects of this patient's care.  The patient presented with acute  onset of difficulty speaking and left-sided facial droop.  I agree with the history, examination, assessment, and plan as documented by the midlevel provider.    Clinical Impression:       ICD-10-CM ICD-9-CM   1. Acute ischemic stroke I63.9 434.91         Disposition:   Disposition: Admitted  Condition: Critical                        Annetta Allen PA-C  06/16/19 0330       Mike Callejas III, MD  06/17/19 0581

## 2019-06-16 NOTE — PROGRESS NOTES
Food & Nutrition  Education    Diet Education: Cardiac-nutritional Therapy  Learners: Pt and family members      Nutrition Education provided with handouts:Cardiac-TLC nutrition therapy diet.       Comments:  Pt reported not following a diet PTA. Discussed Low Na, healthy fats, cooking methods and how to read a label.   All questions and concerns answered.       Follow-Up: yes    Please Re-consult as needed      Thanks!  Jesica Mckeon

## 2019-06-17 VITALS
HEART RATE: 84 BPM | SYSTOLIC BLOOD PRESSURE: 119 MMHG | OXYGEN SATURATION: 96 % | WEIGHT: 190 LBS | RESPIRATION RATE: 16 BRPM | HEIGHT: 64 IN | TEMPERATURE: 98 F | DIASTOLIC BLOOD PRESSURE: 64 MMHG | BODY MASS INDEX: 32.44 KG/M2

## 2019-06-17 PROBLEM — I63.311 CEREBROVASCULAR ACCIDENT (CVA) DUE TO THROMBOSIS OF RIGHT MIDDLE CEREBRAL ARTERY: Status: ACTIVE | Noted: 2019-06-16

## 2019-06-17 LAB
ALBUMIN SERPL BCP-MCNC: 3.4 G/DL (ref 3.5–5.2)
ALP SERPL-CCNC: 131 U/L (ref 55–135)
ALT SERPL W/O P-5'-P-CCNC: 14 U/L (ref 10–44)
ANION GAP SERPL CALC-SCNC: 13 MMOL/L (ref 8–16)
AST SERPL-CCNC: 17 U/L (ref 10–40)
BASOPHILS # BLD AUTO: 0.03 K/UL (ref 0–0.2)
BASOPHILS NFR BLD: 0.5 % (ref 0–1.9)
BILIRUB SERPL-MCNC: 0.5 MG/DL (ref 0.1–1)
BUN SERPL-MCNC: 11 MG/DL (ref 6–20)
CALCIUM SERPL-MCNC: 9.5 MG/DL (ref 8.7–10.5)
CHLORIDE SERPL-SCNC: 104 MMOL/L (ref 95–110)
CO2 SERPL-SCNC: 23 MMOL/L (ref 23–29)
CREAT SERPL-MCNC: 0.7 MG/DL (ref 0.5–1.4)
DIFFERENTIAL METHOD: ABNORMAL
EOSINOPHIL # BLD AUTO: 0.2 K/UL (ref 0–0.5)
EOSINOPHIL NFR BLD: 3.2 % (ref 0–8)
ERYTHROCYTE [DISTWIDTH] IN BLOOD BY AUTOMATED COUNT: 14.5 % (ref 11.5–14.5)
EST. GFR  (AFRICAN AMERICAN): >60 ML/MIN/1.73 M^2
EST. GFR  (NON AFRICAN AMERICAN): >60 ML/MIN/1.73 M^2
GLUCOSE SERPL-MCNC: 168 MG/DL (ref 70–110)
HCT VFR BLD AUTO: 38.3 % (ref 37–48.5)
HGB BLD-MCNC: 12.1 G/DL (ref 12–16)
IMM GRANULOCYTES # BLD AUTO: 0.02 K/UL (ref 0–0.04)
IMM GRANULOCYTES NFR BLD AUTO: 0.3 % (ref 0–0.5)
LYMPHOCYTES # BLD AUTO: 2.9 K/UL (ref 1–4.8)
LYMPHOCYTES NFR BLD: 44.5 % (ref 18–48)
MAGNESIUM SERPL-MCNC: 2 MG/DL (ref 1.6–2.6)
MCH RBC QN AUTO: 25.9 PG (ref 27–31)
MCHC RBC AUTO-ENTMCNC: 31.6 G/DL (ref 32–36)
MCV RBC AUTO: 82 FL (ref 82–98)
MONOCYTES # BLD AUTO: 0.8 K/UL (ref 0.3–1)
MONOCYTES NFR BLD: 12 % (ref 4–15)
NEUTROPHILS # BLD AUTO: 2.6 K/UL (ref 1.8–7.7)
NEUTROPHILS NFR BLD: 39.5 % (ref 38–73)
NRBC BLD-RTO: 0 /100 WBC
PHOSPHATE SERPL-MCNC: 3.8 MG/DL (ref 2.7–4.5)
PLATELET # BLD AUTO: 285 K/UL (ref 150–350)
PMV BLD AUTO: 10.6 FL (ref 9.2–12.9)
POTASSIUM SERPL-SCNC: 3.4 MMOL/L (ref 3.5–5.1)
PROT SERPL-MCNC: 7.3 G/DL (ref 6–8.4)
RBC # BLD AUTO: 4.67 M/UL (ref 4–5.4)
SODIUM SERPL-SCNC: 140 MMOL/L (ref 136–145)
WBC # BLD AUTO: 6.6 K/UL (ref 3.9–12.7)

## 2019-06-17 PROCEDURE — 80053 COMPREHEN METABOLIC PANEL: CPT

## 2019-06-17 PROCEDURE — 25000003 PHARM REV CODE 250: Performed by: PHYSICIAN ASSISTANT

## 2019-06-17 PROCEDURE — 85025 COMPLETE CBC W/AUTO DIFF WBC: CPT

## 2019-06-17 PROCEDURE — 84100 ASSAY OF PHOSPHORUS: CPT

## 2019-06-17 PROCEDURE — 99233 PR SUBSEQUENT HOSPITAL CARE,LEVL III: ICD-10-PCS | Mod: ,,, | Performed by: PSYCHIATRY & NEUROLOGY

## 2019-06-17 PROCEDURE — 36415 COLL VENOUS BLD VENIPUNCTURE: CPT

## 2019-06-17 PROCEDURE — 63600175 PHARM REV CODE 636 W HCPCS: Performed by: NURSE PRACTITIONER

## 2019-06-17 PROCEDURE — 25000003 PHARM REV CODE 250: Performed by: NURSE PRACTITIONER

## 2019-06-17 PROCEDURE — 99233 SBSQ HOSP IP/OBS HIGH 50: CPT | Mod: ,,, | Performed by: PSYCHIATRY & NEUROLOGY

## 2019-06-17 PROCEDURE — 83735 ASSAY OF MAGNESIUM: CPT

## 2019-06-17 PROCEDURE — 92523 SPEECH SOUND LANG COMPREHEN: CPT

## 2019-06-17 PROCEDURE — 92526 ORAL FUNCTION THERAPY: CPT

## 2019-06-17 PROCEDURE — 94761 N-INVAS EAR/PLS OXIMETRY MLT: CPT

## 2019-06-17 RX ORDER — ATORVASTATIN CALCIUM 40 MG/1
40 TABLET, FILM COATED ORAL DAILY
Qty: 90 TABLET | Refills: 3 | Status: SHIPPED | OUTPATIENT
Start: 2019-06-18 | End: 2020-06-17

## 2019-06-17 RX ORDER — CLOPIDOGREL BISULFATE 75 MG/1
75 TABLET ORAL DAILY
Qty: 21 TABLET | Refills: 0 | Status: SHIPPED | OUTPATIENT
Start: 2019-06-18 | End: 2022-06-27

## 2019-06-17 RX ADMIN — ASPIRIN 81 MG: 81 TABLET, COATED ORAL at 09:06

## 2019-06-17 RX ADMIN — HEPARIN SODIUM 5000 UNITS: 5000 INJECTION, SOLUTION INTRAVENOUS; SUBCUTANEOUS at 06:06

## 2019-06-17 RX ADMIN — CLOPIDOGREL 75 MG: 75 TABLET, FILM COATED ORAL at 09:06

## 2019-06-17 RX ADMIN — AMLODIPINE BESYLATE 10 MG: 10 TABLET ORAL at 09:06

## 2019-06-17 RX ADMIN — ATORVASTATIN CALCIUM 40 MG: 20 TABLET, FILM COATED ORAL at 09:06

## 2019-06-17 NOTE — ASSESSMENT & PLAN NOTE
57 y/o female with dysarthria and facial droop, NO TPA or IR  58 y.o. yo female with PMHx prior stroke and HTN who [place] [facility] [place] left facial droop and dysatrhria. LKN 2330 on . Patient evaluated by stroke HAKAN; tPA not given, as as low NIHSS-2. Pt was [place] [facility] [place]. Dr. Simon reviewed images as acquired. No LVO. Patient not a candidate for IR intervention. Admitted to Vascular Neurology for acute stroke workup.     MRI completed on  showed e/o small acute R frontal cortical infarct. Etiology likely  vs ESUS.    Antithrombotics: DAPT    Statins:Lipitor 40 mg daily    Aggressive risk factor modification: HTN, Diet, Exercise, Obesity     Rehab efforts: The patient has been evaluated by a stroke team provider and the therapy needs have been fully considered based off the presenting complaints and exam findings. The following therapy evaluations are needed: SLP evaluate and treat- outpatient therapy    Diagnostics ordered/pending: none    VTE prophylaxis: Heparin 5000 units SQ every 8 hours    BP parameters: Infarct: No intervention, SBP <220

## 2019-06-17 NOTE — NURSING
Pt was discharged home with family. No further questions or complaints. Tele box and IV removed. Belongings sent with pt. Left via wheelchair.

## 2019-06-17 NOTE — ASSESSMENT & PLAN NOTE
57 y/o female with dysarthria and facial droop, NO TPA or IR  58 y.o. yo female with PMHx prior stroke and HTN who [place] [facility] [place]  with left facial droop and dysatrhria. LKN 2330 on . Patient evaluated by stroke HAKAN; tPA not given, as as low NIHSS-2. Pt was [place] [facility] [place] CTA MP. Dr. Simon reviewed images as acquired. No LVO. Patient not a candidate for IR intervention. Admitted to Vascular Neurology for acute stroke workup.     MRI completed on  showed e/o small acute R frontal cortical infarct. Etiology likely  vs ESUS.    Antithrombotics: DAPT    Statins:Lipitor 40 mg daily    Aggressive risk factor modification: HTN, Diet, Exercise, Obesity     Rehab efforts: The patient has been evaluated by a stroke team provider and the therapy needs have been fully considered based off the presenting complaints and exam findings. The following therapy evaluations are needed: SLP evaluate and treat- outpatient therapy    Diagnostics ordered/pending: none    VTE prophylaxis: Heparin 5000 units SQ every 8 hours    BP parameters: Infarct: No intervention, SBP <220

## 2019-06-17 NOTE — HOSPITAL COURSE
6/17: JS. Patient able to ambulate safely without assistance. Patient able to perform all ADLs without assistance. No need for PT/OT to eval and treat. Patient reevaluated by SLP and upgraded to mechanical soft diet with thin liquids. Patient has no current complaints and is medically ready for discharge.

## 2019-06-17 NOTE — PLAN OF CARE
06/17/19 1617   Post-Acute Status   Post-Acute Authorization Other   Other Status No Post-Acute Service Needs       No needs noted. SW in contact with CM and Medical staff. Will continue to follow and offer support as needed.     Audi Roldan LMSW  [facility]   Ext. [phone]

## 2019-06-17 NOTE — PLAN OF CARE
06/17/19 1045   Discharge Assessment   Assessment Type Discharge Planning Assessment   Confirmed/corrected address and phone number on facesheet? Yes   Assessment information obtained from? Patient   Expected Length of Stay (days)   (TBD)   Communicated expected length of stay with patient/caregiver yes   Prior to hospitilization cognitive status: Alert/Oriented;No Deficits   Prior to hospitalization functional status: Independent   Current cognitive status: Alert/Oriented;No Deficits   Current Functional Status: Independent   Lives With spouse   Able to Return to Prior Arrangements yes   Is patient able to care for self after discharge? Yes   Who are your caregiver(s) and their phone number(s)? shauna floyd Spouse     [phone]    Patient's perception of discharge disposition home or selfcare   Readmission Within the Last 30 Days no previous admission in last 30 days   Patient currently being followed by outpatient case management? No   Patient currently receives any other outside agency services? No   Equipment Currently Used at Home none   Do you have any problems affording any of your prescribed medications? No   Is the patient taking medications as prescribed? yes   Does the patient have transportation home? Yes   Transportation Anticipated family or friend will provide   Does the patient receive services at the [facility]? No   Discharge Plan A Home with family;Home   Discharge Plan B Home;Home with family   DME Needed Upon Discharge    (TBD)   Patient/Family in Agreement with Plan yes       Pharmacy:   [facility] [address] [address]  Phone: [phone] Fax: [phone]

## 2019-06-17 NOTE — PLAN OF CARE
Problem: Adult Inpatient Plan of Care  Goal: Plan of Care Review  Outcome: Ongoing (interventions implemented as appropriate)  POC reviewed with pt and family. All questions and concerns addressed. Pt and family verbalized understanding. Vital signs stable and Neuro assessment remains unchanged. Pt progressing towards goals. Individualized stroke book in view at bedside. Will be discharging later today.  For more info see flowsheets. Will continue to monitor.

## 2019-06-17 NOTE — SUBJECTIVE & OBJECTIVE
Neurologic Chief Complaint: left facial droop, dysphasia     Subjective:     Interval History: Patient is seen for follow-up neurological assessment and treatment recommendations: NAEON. Patient able to ambulate safely without assistance. Patient able to perform all ADLs without assistance. No need for PT/OT to eval and treat. Patient reevaluated by SLP and upgraded to mechanical soft diet with thin liquids. Patient has no current complaints and is medically ready for discharge.    HPI, Past Medical, Family, and Social History remains the same as documented in the initial encounter.     Review of Systems   Constitutional: Negative for fatigue and fever.   HENT: Positive for trouble swallowing.    Eyes: Negative for visual disturbance.   Respiratory: Negative for chest tightness and shortness of breath.    Cardiovascular: Negative for chest pain and palpitations.   Gastrointestinal: Negative for diarrhea, nausea and vomiting.   Musculoskeletal: Negative for gait problem.   Neurological: Positive for facial asymmetry and speech difficulty (slurred). Negative for weakness and headaches.   Psychiatric/Behavioral: Negative for confusion and decreased concentration.     Scheduled Meds:   amLODIPine  10 mg Oral Daily    aspirin  81 mg Oral Daily    atorvastatin  40 mg Oral Daily    clopidogrel  75 mg Oral Daily    heparin (porcine)  5,000 Units Subcutaneous Q8H     Continuous Infusions:   sodium chloride 0.9%       PRN Meds:acetaminophen, labetalol, ondansetron, polyethylene glycol, sodium chloride 0.9%, sodium chloride 0.9%    Objective:     Vital Signs (Most Recent):  Temp: 98.7 °F (37.1 °C) (06/17/19 1123)  Pulse: 93 (06/17/19 1340)  Resp: 17 (06/17/19 1340)  BP: 120/73 (06/17/19 1123)  SpO2: 97 % (06/17/19 1340)  BP Location: Right arm    Vital Signs Range (Last 24H):  Temp:  [96.4 °F (35.8 °C)-98.7 °F (37.1 °C)]   Pulse:  [61-93]   Resp:  [16-18]   BP: (104-125)/(61-73)   SpO2:  [94 %-97 %]   BP Location: Right  arm    Physical Exam   Constitutional: She appears well-developed and well-nourished.   HENT:   Head: Normocephalic and atraumatic.   Right Ear: External ear normal.   Left Ear: External ear normal.   Nose: Nose normal.   Mouth/Throat: Uvula is midline, oropharynx is clear and moist and mucous membranes are normal.   Left facial droop   Eyes: Pupils are equal, round, and reactive to light. Conjunctivae and EOM are normal.   Cardiovascular: Normal rate.   Pulmonary/Chest: Effort normal.   Skin: Skin is warm and dry.   Psychiatric: She has a normal mood and affect. Her behavior is normal. Thought content normal. Her speech is slurred.   Nursing note and vitals reviewed.      Neurological Exam:   LOC: alert  Attention Span: Good   Language: No aphasia  Articulation: Dysarthria  Orientation: Person, Place, Time   Visual Fields: Full  EOM (CN III, IV, VI): Full/intact  Pupils (CN II, III): PERRL  Facial Sensation (CN V): Normal  Facial Movement (CN VII): Lower facial weakness on the Left  Motor: Arm left  Normal 5/5  Leg left  Normal 5/5  Arm right  Normal 5/5  Leg right Normal 5/5  Cebellar: No evidence of appendicular or axial ataxia  Sensation: Intact to light touch, temperature and vibration  Tone: Normal tone throughout    Laboratory:  CMP:   Recent Labs   Lab 06/17/19 0414   CALCIUM 9.5   ALBUMIN 3.4*   PROT 7.3      K 3.4*   CO2 23      BUN 11   CREATININE 0.7   ALKPHOS 131   ALT 14   AST 17   BILITOT 0.5     BMP:   Recent Labs   Lab 06/17/19  0414      K 3.4*      CO2 23   BUN 11   CREATININE 0.7   CALCIUM 9.5     CBC:   Recent Labs   Lab 06/17/19  0414   WBC 6.60   RBC 4.67   HGB 12.1   HCT 38.3      MCV 82   MCH 25.9*   MCHC 31.6*     Lipid Panel:   Recent Labs   Lab 06/16/19  0123   CHOL 240*   LDLCALC 149.4   HDL 48   TRIG 213*     Coagulation:   Recent Labs   Lab 06/16/19  0328   INR 0.9   APTT 21.4     Platelet Aggregation Study: No results for input(s): PLTAGG, PLTAGINTERP,  PLTAGREGLACO, ADPPLTAGGREG in the last 168 hours.  Hgb A1C:   Recent Labs   Lab 06/16/19  0328   HGBA1C 7.5*     TSH:   Recent Labs   Lab 06/16/19  0123   TSH 2.859       Diagnostic Results     Brain Imaging     MRI brain w/o contrast 06/16/2019      Linear focus of acute infarction within the posterior right frontal lobe    CT head w/o contrast 06/16/2019    No evidence of acute territorial infarct, hemorrhage, mass effect, or midline shift.    Ventricles are normal in size and configuration.    No displaced calvarial fracture.    Visualized paranasal sinuses and mastoid air cells are clear.    Vessel Imaging     CTA head and neck 06/16/2019  No evidence of an intracranial high-grade stenosis or occlusion.     Cardiac Imaging     TTE 06/16/2019    · Normal left ventricular systolic function. The estimated ejection fraction is 65%  · Grade I (mild) left ventricular diastolic dysfunction consistent with impaired relaxation.  · Mild mitral sclerosis.  · Mild tricuspid regurgitation.  · Normal left atrial pressure.  · Normal right ventricular systolic function.  · No wall motion abnormalities.  · Cannot rule out ASD, would repeat with bubble contrast if indicated.  · The estimated PA systolic pressure is 14 mm Hg  · Normal central venous pressure (3 mm Hg).    Left Ventricle Normal ejection fraction at 65%. Normal left ventricular cavity size. Normal wall thickness observed. Grade I (mild) left ventricular diastolic dysfunction consistent with impaired relaxation. Normal left atrial pressure. No wall motion abnormalities.   Right Ventricle Normal cavity size and ejection fraction.   Left Atrium The left atrium is normal. Left atrial volume index is 30.7 mL/m2.   Right Atrium There is normal right atrial size.   Aortic Valve The valve is trileaflet. There is mild annular calcification of the aortic valve present. There is normal leaflet mobility.   Mitral Valve Mild mitral sclerosis. There is mild mitral annular  calcification. Normal leaflet mobility.   Tricuspid Valve The tricuspid valve is normal. Mild regurgitation. Mobility is normal. The estimated PA systolic pressure is 14 mmHg.   Pulmonic Valve Normal valve structure. Mobility is normal.   IVC/SVC Normal central venous pressure (3 mm Hg).   Ascending Aorta Ascending aorta upper limit of normal

## 2019-06-17 NOTE — PROGRESS NOTES
[facility]  Vascular Neurology  Comprehensive Stroke Center  Progress Note    Assessment/Plan:     * Cerebrovascular accident (CVA) due to thrombosis of right middle cerebral artery  59 y/o female with dysarthria and facial droop, NO TPA or IR  58 y.o. yo female with PMHx prior stroke and HTN who presented to [facility] ED  with left facial droop and dysatrhria. LKN 2330 on . Patient evaluated by stroke HAKAN; tPA not given, as as low NIHSS-2. Pt was transferred to [facility] for stat CTA MP. Dr. Simon reviewed images as acquired. No LVO. Patient not a candidate for IR intervention. Admitted to Vascular Neurology for acute stroke workup.     MRI completed on  showed e/o small acute R frontal cortical infarct. Etiology likely  vs ESUS.    Antithrombotics: DAPT    Statins:Lipitor 40 mg daily    Aggressive risk factor modification: HTN, Diet, Exercise, Obesity     Rehab efforts: The patient has been evaluated by a stroke team provider and the therapy needs have been fully considered based off the presenting complaints and exam findings. The following therapy evaluations are needed: SLP evaluate and treat- outpatient therapy    Diagnostics ordered/pending: none    VTE prophylaxis: Heparin 5000 units SQ every 8 hours    BP parameters: Infarct: No intervention, SBP <220        Facial droop  Due to stroke  Aggressive therapy    Dysarthria  Due to stroke  Aggressive therapy    Hypertension, essential  Stroke risk factor  SBP <220  home Norvasc restarted : NAEON. Patient able to ambulate safely without assistance. Patient able to perform all ADLs without assistance. No need for PT/OT to eval and treat. Patient reevaluated by SLP and upgraded to mechanical soft diet with thin liquids. Patient has no current complaints and is medically ready for discharge.    STROKE DOCUMENTATION   Acute Stroke Times   Last Known Normal Date: 06/15/19  Last Known Normal Time: 2330  Symptom Onset Date:  06/15/19  Symptom Onset Time: 2330  Stroke Team Called Date: 06/16/19  Stroke Team Called Time: 0105  Stroke Team Arrival Date: 06/16/19  Stroke Team Arrival Time: 0110  CT Interpretation Time: 0126    NIH Scale:  1a. Level of Consciousness: 0-->Alert, keenly responsive  1b. LOC Questions: 0-->Answers both questions correctly  1c. LOC Commands: 0-->Performs both tasks correctly  2. Best Gaze: 0-->Normal  3. Visual: 0-->No visual loss  4. Facial Palsy: 1-->Minor paralysis (flattened nasolabial fold, asymmetry on smiling)  5a. Motor Arm, Left: 0-->No drift, limb holds 90 (or 45) degrees for full 10 secs  5b. Motor Arm, Right: 0-->No drift, limb holds 90 (or 45) degrees for full 10 secs  6a. Motor Leg, Left: 0-->No drift, leg holds 30 degree position for full 5 secs  6b. Motor Leg, Right: 0-->No drift, leg holds 30 degree position for full 5 secs  7. Limb Ataxia: 0-->Absent  8. Sensory: 0-->Normal, no sensory loss  9. Best Language: 0-->No aphasia, normal  10. Dysarthria: 1-->Mild-to-moderate dysarthria, patient slurs at least some words and, at worst, can be understood with some difficulty  11. Extinction and Inattention (formerly Neglect): 0-->No abnormality  Total (NIH Stroke Scale): 2       Modified [place] Score: 2  [place] Coma Scale:    ABCD2 Score:    [id number] Score:   HAS -BLED Score:   ICH Score:   Hunt & Brennan Classification:      Hemorrhagic change of an Ischemic Stroke: Does this patient have an ischemic stroke with hemorrhagic changes? No     Neurologic Chief Complaint: left facial droop, dysphasia     Subjective:     Interval History: Patient is seen for follow-up neurological assessment and treatment recommendations: FOUZIAON. Patient able to ambulate safely without assistance. Patient able to perform all ADLs without assistance. No need for PT/OT to eval and treat. Patient reevaluated by SLP and upgraded to mechanical soft diet with thin liquids. Patient has no current complaints and is medically ready  for discharge.    HPI, Past Medical, Family, and Social History remains the same as documented in the initial encounter.     Review of Systems   Constitutional: Negative for fatigue and fever.   HENT: Positive for trouble swallowing.    Eyes: Negative for visual disturbance.   Respiratory: Negative for chest tightness and shortness of breath.    Cardiovascular: Negative for chest pain and palpitations.   Gastrointestinal: Negative for diarrhea, nausea and vomiting.   Musculoskeletal: Negative for gait problem.   Neurological: Positive for facial asymmetry and speech difficulty (slurred). Negative for weakness and headaches.   Psychiatric/Behavioral: Negative for confusion and decreased concentration.     Scheduled Meds:   amLODIPine  10 mg Oral Daily    aspirin  81 mg Oral Daily    atorvastatin  40 mg Oral Daily    clopidogrel  75 mg Oral Daily    heparin (porcine)  5,000 Units Subcutaneous Q8H     Continuous Infusions:   sodium chloride 0.9%       PRN Meds:acetaminophen, labetalol, ondansetron, polyethylene glycol, sodium chloride 0.9%, sodium chloride 0.9%    Objective:     Vital Signs (Most Recent):  Temp: 98.7 °F (37.1 °C) (06/17/19 1123)  Pulse: 93 (06/17/19 1340)  Resp: 17 (06/17/19 1340)  BP: 120/73 (06/17/19 1123)  SpO2: 97 % (06/17/19 1340)  BP Location: Right arm    Vital Signs Range (Last 24H):  Temp:  [96.4 °F (35.8 °C)-98.7 °F (37.1 °C)]   Pulse:  [61-93]   Resp:  [16-18]   BP: (104-125)/(61-73)   SpO2:  [94 %-97 %]   BP Location: Right arm    Physical Exam   Constitutional: She appears well-developed and well-nourished.   HENT:   Head: Normocephalic and atraumatic.   Right Ear: External ear normal.   Left Ear: External ear normal.   Nose: Nose normal.   Mouth/Throat: Uvula is midline, oropharynx is clear and moist and mucous membranes are normal.   Left facial droop   Eyes: Pupils are equal, round, and reactive to light. Conjunctivae and EOM are normal.   Cardiovascular: Normal rate.    Pulmonary/Chest: Effort normal.   Skin: Skin is warm and dry.   Psychiatric: She has a normal mood and affect. Her behavior is normal. Thought content normal. Her speech is slurred.   Nursing note and vitals reviewed.      Neurological Exam:   LOC: alert  Attention Span: Good   Language: No aphasia  Articulation: Dysarthria  Orientation: Person, Place, Time   Visual Fields: Full  EOM (CN III, IV, VI): Full/intact  Pupils (CN II, III): PERRL  Facial Sensation (CN V): Normal  Facial Movement (CN VII): Lower facial weakness on the Left  Motor: Arm left  Normal 5/5  Leg left  Normal 5/5  Arm right  Normal 5/5  Leg right Normal 5/5  Cebellar: No evidence of appendicular or axial ataxia  Sensation: Intact to light touch, temperature and vibration  Tone: Normal tone throughout    Laboratory:  CMP:   Recent Labs   Lab 06/17/19  0414   CALCIUM 9.5   ALBUMIN 3.4*   PROT 7.3      K 3.4*   CO2 23      BUN 11   CREATININE 0.7   ALKPHOS 131   ALT 14   AST 17   BILITOT 0.5     BMP:   Recent Labs   Lab 06/17/19  0414      K 3.4*      CO2 23   BUN 11   CREATININE 0.7   CALCIUM 9.5     CBC:   Recent Labs   Lab 06/17/19 0414   WBC 6.60   RBC 4.67   HGB 12.1   HCT 38.3      MCV 82   MCH 25.9*   MCHC 31.6*     Lipid Panel:   Recent Labs   Lab 06/16/19  0123   CHOL 240*   LDLCALC 149.4   HDL 48   TRIG 213*     Coagulation:   Recent Labs   Lab 06/16/19  0328   INR 0.9   APTT 21.4     Platelet Aggregation Study: No results for input(s): PLTAGG, PLTAGINTERP, PLTAGREGLACO, ADPPLTAGGREG in the last 168 hours.  Hgb A1C:   Recent Labs   Lab 06/16/19  0328   HGBA1C 7.5*     TSH:   Recent Labs   Lab 06/16/19  0123   TSH 2.859       Diagnostic Results     Brain Imaging     MRI brain w/o contrast 06/16/2019      Linear focus of acute infarction within the posterior right frontal lobe    CT head w/o contrast 06/16/2019    No evidence of acute territorial infarct, hemorrhage, mass effect, or midline  shift.    Ventricles are normal in size and configuration.    No displaced calvarial fracture.    Visualized paranasal sinuses and mastoid air cells are clear.    Vessel Imaging     CTA head and neck 06/16/2019  No evidence of an intracranial high-grade stenosis or occlusion.     Cardiac Imaging     TTE 06/16/2019    · Normal left ventricular systolic function. The estimated ejection fraction is 65%  · Grade I (mild) left ventricular diastolic dysfunction consistent with impaired relaxation.  · Mild mitral sclerosis.  · Mild tricuspid regurgitation.  · Normal left atrial pressure.  · Normal right ventricular systolic function.  · No wall motion abnormalities.  · Cannot rule out ASD, would repeat with bubble contrast if indicated.  · The estimated PA systolic pressure is 14 mm Hg  · Normal central venous pressure (3 mm Hg).    Left Ventricle Normal ejection fraction at 65%. Normal left ventricular cavity size. Normal wall thickness observed. Grade I (mild) left ventricular diastolic dysfunction consistent with impaired relaxation. Normal left atrial pressure. No wall motion abnormalities.   Right Ventricle Normal cavity size and ejection fraction.   Left Atrium The left atrium is normal. Left atrial volume index is 30.7 mL/m2.   Right Atrium There is normal right atrial size.   Aortic Valve The valve is trileaflet. There is mild annular calcification of the aortic valve present. There is normal leaflet mobility.   Mitral Valve Mild mitral sclerosis. There is mild mitral annular calcification. Normal leaflet mobility.   Tricuspid Valve The tricuspid valve is normal. Mild regurgitation. Mobility is normal. The estimated PA systolic pressure is 14 mmHg.   Pulmonic Valve Normal valve structure. Mobility is normal.   IVC/SVC Normal central venous pressure (3 mm Hg).   Ascending Aorta Ascending aorta upper limit of normal           Robert Choi NP  Comprehensive Stroke Center  Department of Vascular Neurology   [facility]

## 2019-06-17 NOTE — NURSING
Patient ambulated in hallway. No concerns or distress noted. Able to ambulate without any assistance or device. Pt states she felt really good being out of the bed and walking.

## 2019-06-17 NOTE — PT/OT/SLP EVAL
"Speech Language Pathology Evaluation  Cognitive Communication    Patient Name:  Hortencia Cr   MRN:  [id number]  Admitting Diagnosis: Acute ischemic stroke    Recommendations:     Recommendations:                General Recommendations:  Dysphagia therapy, Speech/language therapy and Cognitive-linguistic therapy  Diet recommendations:  Mechanical soft, Thin   Aspiration Precautions: 1 bite/sip at a time, Alternating bites/sips, HOB to 90 degrees, Monitor for s/s of aspiration, Small bites/sips and Strict aspiration precautions . Administer meds whole buried in puree.  If any s/s difficulty noted, crush in puree.   General Precautions: Standard, aspiration  Communication strategies:  provide increased time to answer and go to room if call light pushed    History:     Past Medical History:   Diagnosis Date    Hypertension        No past surgical history on file.    Social History: Patient lives with . Daughter reportedly lives close by.  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    Prior diet: Regular/thin      Subjective     "my speech" (pt reports persisting difficulty with speech)    Pain/Comfort:  Pain Rating 1: 0/10  Pain Rating Post-Intervention 1: 0/10    Objective:   Cognitive Status:    Arousal/Alertness- awake/alert/cooperative  Attention- WFL  Orientation - Ox4  Memory - Recall of general information was WFL.  Pt immediately repeated 5 digit and 5 word sequences.   Following a filled delay, pt recalled 3/3 words ind'ly.    Problem Solving - answers to basic hypothetical problem solving questions were functional.  Pt was able to compare/contrast two given items and sequenced 4 stimulus items with repetition x1.      Receptive Language:   Comprehension:   WFL for complex command and complex y/n questions.    Expressive Language:  Verbal:    Pt named common objects with 100% accuracy.  She listed 12 items on a word fluency task in one minute, " when 15-20 is considered wnl.   Pt endorses some word finding difficulty.      Motor Speech:  Dysarthria noted as well as dysfluencies (c/b sound hesitation and sound repetitions)    Voice:   Somewhat hoarse vocal quality noted with reduced intensity.    Visual-Spatial:  TBA    Reading:   TBA    Written Expression:   TBA    Treatment: Pt was seated upright for ongoing swallow assessment.   Pt/nursing report tolerance of current diet; however, pt indicates minimal intake due to dislike of puree food.  Ms. Cr was presented with ice chips x2, tsp thin water x3, cup sips x2, and straw sips x3.  Also observed pt with puree trials x5 and cracker x2.  Oral phase was c/b increased mastication time.  Pt benefited from liquid wash post solid trial.  Education provided regarding risk for pocketing and need for to allow for increased mastication time on stronger side.  Pt verbalized understanding and implement strategies during trials.  Initiation of pharyngeal phase was mildly delayed and laryngeal elevation appeared fair to palpation.  No overt coughing/choking/throat clearing or voice change detected post any consistency.  Pt eager to trail diet upgrade. Thorough education provided regarding ST role, dysphagia, aspiration risk, s/s aspiration, precautions and compensatory strategies to reduce risk.  Results of session/recommendations reviewed with nursing/MD team post session.     Assessment:   Hortencia Cr is a 58 y.o. female with an SLP diagnosis of Dysphagia, Dysarthria, Dysfluencies and higher level word finding difficulty.      Goals:   Multidisciplinary Problems     SLP Goals        Problem: SLP Goal    Goal Priority Disciplines Outcome   SLP Goal     SLP    Description:  Speech Language Pathology Goals  Goals expected to be met by 6/24  1. Pt will tolerate diet of mechanical soft with thin liquids without overt clinical signs of aspiration.  2. Pt will participate in trials of regular solids within speech  therapy sessions to help determine least restrictive diet.  3.Pt will complete OMEs x10 w/ SLP model  to enhance oral motor function.  4. Further assess reading/writing/visual-spatial skills and higher level cognition including math/time/money management.   5. Pt will complete sentence level motor speech tasks with min cues to implement speech strategies.   6. Pt will complete higher level word finding tasks with 80% accuracy.      Speech Language Pathology Goals  Goals expected to be met by 6/23  1. Pt will tolerate diet of nectar thick and pureed without overt clinical signs of aspiration - met.  2. Pt will participate in trials of thin liquids and soft solids within speech therapy sessions to help determine least restrictive diet - met.  3. Pt will participate in additional objective swallow assessment as needed - met.  4.Pt will complete OMEs x10 w/ SLP model  to enhance oral motor function - ongoing.  5.Pt will participate in ongoing assessment of speech language and cognitive linguistic skills to help rule out deficits and determine therapeutic plan of care - met.                            Plan:   · Patient to be seen:  4 x/week   · Plan of Care expires:     · Plan of Care reviewed with:  patient, spouse, daughter   · SLP Follow-Up:  Yes       Discharge recommendations:  Discharge Facility/Level of Care Needs: rehabilitation facility ; pending any PT/OT recs    Time Tracking:   SLP Treatment Date:   06/17/19  Speech Start Time:  0845  Speech Stop Time:  0924     Speech Total Time (min):  39 min    Billable Minutes: Eval 24  and Treatment Swallowing Dysfunction 15    MEILY Alarcon, CCC-SLP  Speech Language Pathologist  [phone]  6/17/2019

## 2019-06-17 NOTE — DISCHARGE SUMMARY
[facility]  Vascular Neurology  Comprehensive Stroke Center  Discharge Summary     Summary:     Admit Date: 6/16/2019  1:05 AM    Discharge Date and Time:  06/17/2019 4:51 PM    Attending Physician: Ashish Simon MD     Discharge Provider: Robert Choi NP    History of Present Illness: 59 y/o femkim who was up drying her granddaughters hair when she called her son around 2330 and did not say anything to him. He called back and could not understand her. Family noticed facial droop and dysarthria. Patient brought to ED for evaluation.  Upon examination patient only neuro deficit is facial droop and dysarthria. NIHSS 2. CT scan no acute process seen. CTA head and neck multiphase no LVO seen. NO TPA as low NIHSS and no IR.  Risk factors HTN, obesity    Hospital Course (synopsis of major diagnoses, care, treatment, and services provided during the course of the hospital stay):    Ms. Hortencia Cr was admitted to Vascular Neurology for acute stroke workup. Patient is a 58 y.o. yo female with PMHx prior stroke and HTN who presented to [facility] ED  with left facial droop and dysatrhria. LKN 2330 on 06/16. Patient evaluated by stroke HAKAN; tPA not given, as as low NIHSS-2. Pt was transferred to [facility] for stat CTA MP. Dr. Simon reviewed images as acquired. No occlusion or high grade stenosis. Stroke etiology suspected to be small artery occlusion 2/2 small vessel disease vs ESUS at this time. 30 day event monitor ordered at discharge. TTE completed on 6/16 abnormal; patient to follow up with cardiology as an outpatient and have TTE with bubble completed as an outpatient. Patient will also have outpatient SLP to eval and treat dysarthria and aphasia.     Patient discharged with recommendations for outpatient therapy. Family at bedside amenable to plan.     Patient with improvement in stroke symptoms since admission. Inpatient acute stroke work up completed and patient stable for discharge. Please see  all appropriate medication changes, imaging results, and necessary follow-up below.           6/17: NAEON. Patient able to ambulate safely without assistance. Patient able to perform all ADLs without assistance. No need for PT/OT to eval and treat. Patient reevaluated by SLP and upgraded to mechanical soft diet with thin liquids. Patient has no current complaints and is medically ready for discharge.    STROKE DOCUMENTATION   Acute Stroke Times   Last Known Normal Date: 06/15/19  Last Known Normal Time: 2330  Symptom Onset Date: 06/15/19  Symptom Onset Time: 2330  Stroke Team Called Date: 06/16/19  Stroke Team Called Time: 0105  Stroke Team Arrival Date: 06/16/19  Stroke Team Arrival Time: 0110  CT Interpretation Time: 0126     NIH Scale:  1a. Level of Consciousness: 0-->Alert, keenly responsive  1b. LOC Questions: 0-->Answers both questions correctly  1c. LOC Commands: 0-->Performs both tasks correctly  2. Best Gaze: 0-->Normal  3. Visual: 0-->No visual loss  4. Facial Palsy: 1-->Minor paralysis (flattened nasolabial fold, asymmetry on smiling)  5a. Motor Arm, Left: 0-->No drift, limb holds 90 (or 45) degrees for full 10 secs  5b. Motor Arm, Right: 0-->No drift, limb holds 90 (or 45) degrees for full 10 secs  6a. Motor Leg, Left: 0-->No drift, leg holds 30 degree position for full 5 secs  6b. Motor Leg, Right: 0-->No drift, leg holds 30 degree position for full 5 secs  7. Limb Ataxia: 0-->Absent  8. Sensory: 0-->Normal, no sensory loss  9. Best Language: 0-->No aphasia, normal  10. Dysarthria: 1-->Mild-to-moderate dysarthria, patient slurs at least some words and, at worst, can be understood with some difficulty  11. Extinction and Inattention (formerly Neglect): 0-->No abnormality  Total (NIH Stroke Scale): 2        Modified Jennifer Score: 2  [place] Coma Scale:    ABCD2 Score:    [id number] Score:   HAS -BLED Score:   ICH Score:   Hunt & Brennan Classification:       Assessment/Plan:     Diagnostic Results       Brain Imaging      MRI brain w/o contrast 06/16/2019       Linear focus of acute infarction within the posterior right frontal lobe     CT head w/o contrast 06/16/2019     No evidence of acute territorial infarct, hemorrhage, mass effect, or midline shift.    Ventricles are normal in size and configuration.    No displaced calvarial fracture.    Visualized paranasal sinuses and mastoid air cells are clear.     Vessel Imaging      CTA head and neck 06/16/2019  No evidence of an intracranial high-grade stenosis or occlusion.      Cardiac Imaging      TTE 06/16/2019     · Normal left ventricular systolic function. The estimated ejection fraction is 65%  · Grade I (mild) left ventricular diastolic dysfunction consistent with impaired relaxation.  · Mild mitral sclerosis.  · Mild tricuspid regurgitation.  · Normal left atrial pressure.  · Normal right ventricular systolic function.  · No wall motion abnormalities.  · Cannot rule out ASD, would repeat with bubble contrast if indicated.  · The estimated PA systolic pressure is 14 mm Hg  · Normal central venous pressure (3 mm Hg).     Left Ventricle Normal ejection fraction at 65%. Normal left ventricular cavity size. Normal wall thickness observed. Grade I (mild) left ventricular diastolic dysfunction consistent with impaired relaxation. Normal left atrial pressure. No wall motion abnormalities.   Right Ventricle Normal cavity size and ejection fraction.   Left Atrium The left atrium is normal. Left atrial volume index is 30.7 mL/m2.   Right Atrium There is normal right atrial size.   Aortic Valve The valve is trileaflet. There is mild annular calcification of the aortic valve present. There is normal leaflet mobility.   Mitral Valve Mild mitral sclerosis. There is mild mitral annular calcification. Normal leaflet mobility.   Tricuspid Valve The tricuspid valve is normal. Mild regurgitation. Mobility is normal. The estimated PA systolic pressure is 14 mmHg.   Pulmonic  Valve Normal valve structure. Mobility is normal.   IVC/SVC Normal central venous pressure (3 mm Hg).   Ascending Aorta Ascending aorta upper limit of normal              Interventions: None    Complications: None    Disposition: Home or Self Care    Final Active Diagnoses:    Diagnosis Date Noted POA    PRINCIPAL PROBLEM:  Cerebrovascular accident (CVA) due to thrombosis of right middle cerebral artery [I63.311] 2019 Yes    Hypertension, essential [I10] 2019 Yes    Dysarthria [R47.1] 2019 Yes    Facial droop [R29.810] 2019 Yes      Problems Resolved During this Admission:     * Cerebrovascular accident (CVA) due to thrombosis of right middle cerebral artery  59 y/o female with dysarthria and facial droop, NO TPA or IR  58 y.o. yo female with PMHx prior stroke and HTN who presented to [facility] ED  with left facial droop and dysatrhria. LKN 2330 on . Patient evaluated by stroke HAKAN; tPA not given, as as low NIHSS-2. Pt was transferred to [facility] for stat CTA MP. Dr. Simon reviewed images as acquired. No LVO. Patient not a candidate for IR intervention. Admitted to Vascular Neurology for acute stroke workup.     MRI completed on  showed e/o small acute R frontal cortical infarct. Etiology likely  vs ESUS.    Antithrombotics: DAPT    Statins:Lipitor 40 mg daily    Aggressive risk factor modification: HTN, Diet, Exercise, Obesity     Rehab efforts: The patient has been evaluated by a stroke team provider and the therapy needs have been fully considered based off the presenting complaints and exam findings. The following therapy evaluations are needed: SLP evaluate and treat- outpatient therapy    Diagnostics ordered/pending: none    VTE prophylaxis: Heparin 5000 units SQ every 8 hours    BP parameters: Infarct: No intervention, SBP <220        Facial droop  Due to stroke  Aggressive therapy    Dysarthria  Due to stroke  Aggressive therapy    Hypertension, essential  Stroke risk  factor  SBP <220  home Norvasc restarted 6/16      Recommendations:     Post-discharge complication risks: None    Stroke Education given to: patient    Follow-up in Stroke Clinic in 4-6 weeks.    Discharge Plan:  Antithrombotics: Aspirin 81 mg  Statin: Atorvastatin 40 mg  Aggresive risk factor modification:  Hypertension    Follow Up:  Follow-up Information     Provider [facility].    Why:  Hospital Follow-up/ Call to schedule an appointment with your PCP within 10 days.           [facility].    Specialty:  Neurology  Why:  Office will contact you to schedule an appointment in 4-6 weeks.  Contact information:  [id number]  [facility] [address] [phone]  Additional information:  7th Floor                 Patient Instructions:      US TCD EMBOLI WITH IV INJ   Standing Status: Future Standing Exp. Date: 06/17/20     Order Specific Question Answer Comments   May the Radiologist modify the order per protocol to meet the clinical needs of the patient? Yes      Ambulatory referral to Cardiology   Referral Priority: Routine Referral Type: Consultation   Referral Reason: Specialty Services Required   Requested Specialty: Cardiology   Number of Visits Requested: 1     Notify your health care provider if you experience any of the following:  increased confusion or weakness     Notify your health care provider if you experience any of the following:  persistent dizziness, light-headedness, or visual disturbances     Notify your health care provider if you experience any of the following:  severe persistent headache     Notify your health care provider if you experience any of the following:  difficulty breathing or increased cough     Notify your health care provider if you experience any of the following:  persistent nausea and vomiting or diarrhea     Notify your health care provider if you experience any of the following:  severe uncontrolled pain     Notify your health care  provider if you experience any of the following:  temperature >100.4     Activity as tolerated       Medications:  Reconciled Home Medications:      Medication List      START taking these medications    atorvastatin 40 MG tablet  Commonly known as:  LIPITOR  Take 1 tablet (40 mg total) by mouth once daily.  Start taking on:  6/18/2019     clopidogrel 75 mg tablet  Commonly known as:  PLAVIX  Take 1 tablet (75 mg total) by mouth once daily. for 21 doses  Start taking on:  6/18/2019        CONTINUE taking these medications    amLODIPine 10 MG tablet  Commonly known as:  NORVASC  Take 10 mg by mouth once daily.     aspirin 81 MG EC tablet  Commonly known as:  ECOTRIN  Take 81 mg by mouth once daily.            Robert Choi NP  Comprehensive Stroke Center  Department of Vascular Neurology   [facility]

## 2019-06-18 NOTE — PLAN OF CARE
06/18/19 0840   Final Note   Assessment Type Final Discharge Note   Anticipated Discharge Disposition Home   Right Care Referral Info   Post Acute Recommendation No Care

## 2019-06-19 ENCOUNTER — PATIENT OUTREACH (OUTPATIENT)
Dept: ADMINISTRATIVE | Facility: CLINIC | Age: 59
End: 2019-06-19

## 2019-06-19 NOTE — PATIENT INSTRUCTIONS
Discharge Instructions for Stroke    You have been diagnosed with or have a high risk for a stroke, or a TIA (transient ischemic attack). During a stroke, blood stops flowing to part of your brain. This can damage areas in the brain that control other parts of the body. Symptoms after a stroke depend on which part of the brain has been affected.    Stroke risk factors    Once youve had a stroke, youre at greater risk for another one. Listed below are some other factors that can increase your risk for a stroke:    High blood pressure      High cholesterol      Cigarette or cigar smoking      Diabetes      Carotid or other artery disease      Atrial fibrillation, atrial flutter, or other heart disease      Not being physically active      Obesity      Certain blood disorders  such as sickle cell anemia      Drinking too much alcohol      Abusing street drugs      Race      Gender      Family history of stroke      Diet high in salty, fried, or greasy foods      Changes in daily living    Doing your regular tasks may be difficult after youve had a stroke, but you can learn new ways to manage your daily activities. In fact, doing daily activities may help you to regain muscle strength. This can also help your affected arm or leg work more normally. Be patient, give yourself time to adjust, and appreciate the progress you make.    Daily activities    You may be at risk of falling. Make changes to your home to help you walk more easily. A therapist will decide if you need an assistive device to walk safely.    You may need to see an occupational therapist or physical therapist to learn new ways of doing things. For example, you may need to make adjustments when bathing or dressing:    Tips for showering or bathing    Test the water temperature with a hand or foot that was not affected by the stroke.      Use grab bars, a shower seat, a hand-held showerhead, and a long-handled brush.      Tips for  getting dressed    Dress while sitting, starting with the affected side or limb.      Wear shirts that pull easily over your head. Wear pants or skirts with elastic waistbands.      Use zippers with loops attached to the pull tabs.      Lifestyle changes    Take your medicines exactly as directed. Dont skip doses.      Begin an exercise program. Ask your provider how to get started. Also ask how much activity you should try to get on a daily or weekly basis. You can benefit from simple activities such as walking or gardening.      Limit how much alcohol you drink. Men should have no more than 2 alcoholic drinks a day. Women should limit themselves to 1 alcoholic drink per day.      Know your cholesterol level. Follow your providers recommendations about how to keep cholesterol under control.      If you are a smoker, quit now. Join a stop-smoking program to improve your chances of success. Ask your provider about medicines or other methods to help you quit.      Learn stress management techniques to help you deal with stress in your home and work life.      Diet    Your healthcare provider will give you information on changes you may need to make to your diet, based on your situation. Your provider may recommend that you see a registered dietitian for help with diet changes. Changes may include:    Reducing the amount of fat and cholesterol you eat      Reducing the amount of salt (sodium) in your diet, especially if you have high blood pressure      Eating more fresh vegetables and fruits      Eating more lean proteins, such as fish, poultry, and beans and peas (legumes)      Eating less red meat and processed meats      Using low-fat dairy products      Limiting vegetable oils and nut oils      Limiting sweets and processed foods such as chips, cookies, and baked goods      Not eating trans fats. These are often found in processed foods. Don't eat any food that has hydrogenated listed in its  ingredients.      Follow-up care    Keep your medical appointments. Close follow-up is important to stroke rehabilitation and recovery.      Some medicines require blood tests to check for progress or problems. Keep follow-up appointments for any blood tests ordered by your providers.        Call 911    Call 911 right away if you have any of the following symptoms of stroke:    Weakness, tingling, or loss of feeling on one side of your face or body      Sudden double vision or trouble seeing in one or both eyes      Sudden trouble talking or slurred speech      Trouble understanding others      Sudden, severe headache      Dizziness, loss of balance, or a sense of falling      Blackouts or seizures           F.A.S.T. is an easy way to remember the signs of stroke. When you see these signs, you know that you need to call 911 fast.    F.A.S.T. stands for:    F is for face drooping. One side of the face is drooping or numb. When the person smiles, the smile is uneven.      A is for arm weakness. One arm is weak or numb. When the person lifts both arms at the same time, one arm may drift downward.      S is for speech difficulty. You may notice slurred speech or trouble speaking. The person can't repeat a simple sentence correctly when asked.      T is for time to call 911. If someone shows any of these symptoms, even if they go away, call 911 right away. Make note of the time the symptoms first appeared.             © 1113-6218 [employer]. [address] [place] [address], [place] [address]. All rights reserved. This information is not intended as a substitute for professional medical care. Always follow your healthcare professional's instructions.    Sepsis   Sepsis occurs when your body responds to bacteremia - the presence of bacteria in your bloodstream. Sepsis can be deadly. Blood pressure may drop and the lungs and kidneys may start to fail. Emergency care for sepsis is crucial   When to Go to  the Emergency Department (ED)   Sepsis is a medical emergency. Go to the nearest emergency department if a fever is present with any of these symptoms:   Chills and shaking   Fever or low body temperature (hypothermia)   Rapid heartbeat and breathing; shortness of breath   Nausea or vomiting   Confusion, dizziness   Skin rash   Decreased urination     © 2000-2019[employer]hyllis [employer]gel[address] [address]. All rights reserved. This information is not intended as a substitute for professional medical care. Always follow your healthcare professional's instructions

## 2019-06-25 ENCOUNTER — HOME CARE VISIT (OUTPATIENT)
Dept: NEUROLOGY | Facility: HOSPITAL | Age: 59
End: 2019-06-25

## 2019-06-25 VITALS
DIASTOLIC BLOOD PRESSURE: 80 MMHG | RESPIRATION RATE: 20 BRPM | OXYGEN SATURATION: 98 % | HEART RATE: 72 BPM | SYSTOLIC BLOOD PRESSURE: 118 MMHG

## 2019-06-25 NOTE — PROGRESS NOTES
Ms. Cr doing really well today meeting at the [employer] with her . Pleasant woman high functioning mentally and physically, ambulating and performing ADL's independently. Strong support of her  and daughters, assists with diet medications and follow up appnt. BP/Pulse WNL eating/swallowing with no difficulties, states sleeping through the night. Patient continues to need instruction with heart healthy diet and exercise.

## 2019-06-26 ENCOUNTER — TELEPHONE (OUTPATIENT)
Dept: NEUROSURGERY | Facility: HOSPITAL | Age: 59
End: 2019-06-26

## 2019-06-27 ENCOUNTER — CLINICAL SUPPORT (OUTPATIENT)
Dept: CARDIOLOGY | Facility: HOSPITAL | Age: 59
End: 2019-06-27
Attending: NURSE PRACTITIONER
Payer: MEDICAID

## 2019-06-27 DIAGNOSIS — I63.311 CEREBROVASCULAR ACCIDENT (CVA) DUE TO THROMBOSIS OF RIGHT MIDDLE CEREBRAL ARTERY: ICD-10-CM

## 2019-06-27 PROCEDURE — 93272 CARDIAC EVENT MONITOR (CUPID ONLY): ICD-10-PCS | Mod: ,,, | Performed by: INTERNAL MEDICINE

## 2019-06-27 PROCEDURE — 93272 ECG/REVIEW INTERPRET ONLY: CPT | Mod: ,,, | Performed by: INTERNAL MEDICINE

## 2019-06-27 PROCEDURE — 93271 ECG/MONITORING AND ANALYSIS: CPT

## 2019-07-31 ENCOUNTER — OFFICE VISIT (OUTPATIENT)
Dept: NEUROLOGY | Facility: CLINIC | Age: 59
End: 2019-07-31
Payer: MEDICAID

## 2019-07-31 VITALS
DIASTOLIC BLOOD PRESSURE: 89 MMHG | HEART RATE: 83 BPM | WEIGHT: 190 LBS | HEIGHT: 64 IN | SYSTOLIC BLOOD PRESSURE: 149 MMHG | BODY MASS INDEX: 32.44 KG/M2

## 2019-07-31 DIAGNOSIS — I63.411 CEREBROVASCULAR ACCIDENT (CVA) DUE TO EMBOLISM OF RIGHT MIDDLE CEREBRAL ARTERY: Primary | ICD-10-CM

## 2019-07-31 PROCEDURE — 99214 PR OFFICE/OUTPT VISIT, EST, LEVL IV, 30-39 MIN: ICD-10-PCS | Mod: S$PBB,,, | Performed by: PSYCHIATRY & NEUROLOGY

## 2019-07-31 PROCEDURE — 99213 OFFICE O/P EST LOW 20 MIN: CPT | Mod: PBBFAC | Performed by: PSYCHIATRY & NEUROLOGY

## 2019-07-31 PROCEDURE — 99999 PR PBB SHADOW E&M-EST. PATIENT-LVL III: CPT | Mod: PBBFAC,,, | Performed by: PSYCHIATRY & NEUROLOGY

## 2019-07-31 PROCEDURE — 99999 PR PBB SHADOW E&M-EST. PATIENT-LVL III: ICD-10-PCS | Mod: PBBFAC,,, | Performed by: PSYCHIATRY & NEUROLOGY

## 2019-07-31 PROCEDURE — 99214 OFFICE O/P EST MOD 30 MIN: CPT | Mod: S$PBB,,, | Performed by: PSYCHIATRY & NEUROLOGY

## 2019-07-31 RX ORDER — METFORMIN HYDROCHLORIDE 500 MG/1
500 TABLET ORAL
COMMUNITY
Start: 2019-06-25 | End: 2022-06-27

## 2019-07-31 NOTE — PROGRESS NOTES
Hortecnia Cr is a 58 y.o. year old female that  presents for stroke follow up.    HPI:  Mrs Cr has HTN ,TIA in 2004, and history of small R frontal infarct.  Review of the available electronic medical records indicate that she was admitted to [facility] with acute onset left facial droop and dysarthria and did not receive iv alteplase due to minimal deficit. MP-CTA head and neck showed no LVO, high grade intracranial arterial disease, or hemodynamically significant carotid disease. MRI brain revealed a linear focus of acute infarction within the posterior right frontal lobe. TTE: normal EF, no wall motion abnormality, LA normal size.  Concern for ESUS prompted 30 day event monitor which was just completed but results pending. Further, TCD-bubble was also requested.  Mrs Cr reports no recurrence of stroke like symptoms, no spells concerning for post stroke seizures, no mood changes, no post stroke fatigue, or cognitive dysfunction.  Stated that she sometimes has trouble pronouncing certain words but for the most part her speech is significantly better.  Able to perform all her activities of daily living without assistance.  Today, she denies HA, vertigo, double vision, focal weakness or numbness, unsteadiness, falls, or visual disturbances.  On ASA and atorvastatin without noticeable side effects.  Please, see attach cerebrovascular history for further details regarding her admission to our stroke service.    Cerebrovascular History:  was up drying her granddaughters hair when she called her son around 2330 and did not say anything to him. He called back and could not understand her. Family noticed facial droop and dysarthria. Patient brought to ED for evaluation.  Upon examination patient only neuro deficit is facial droop and dysarthria. NIHSS 2. CT scan no acute process seen. CTA head and neck multiphase no LVO seen. NO TPA as low NIHSS and no IR.  Risk factors HTN, obesity   Hospital Course  (synopsis of major diagnoses, care, treatment, and services provided during the course of the hospital stay):   Ms. Hortencia Cr was admitted to Vascular Neurology for acute stroke workup. Patient is a 58 y.o. yo female with PMHx prior stroke and HTN who presented to [facility] [place]  with left facial droop and dysatrhria. LKN 2330 on 06/16. Patient evaluated by stroke HAKAN; tPA not given, as as low NIHSS-2. Pt was transferred to [facility] for stat CTA MP. Dr. Simon reviewed images as acquired. No occlusion or high grade stenosis. Stroke etiology suspected to be small artery occlusion 2/2 small vessel disease vs ESUS at this time. 30 day event monitor ordered at discharge. TTE completed on 6/16 abnormal; patient to follow up with cardiology as an outpatient and have TTE with bubble completed as an outpatient. Patient will also have outpatient SLP to eval and treat dysarthria and aphasia.   Patient discharged with recommendations for outpatient therapy. Family at bedside amenable to plan.  Patient with improvement in stroke symptoms since admission. Inpatient acute stroke work up completed and patient stable for discharge. Please see all appropriate medication changes, imaging results, and necessary follow-up below.     6/17: NAEON. Patient able to ambulate safely without assistance. Patient able to perform all ADLs without assistance. No need for PT/OT to eval and treat. Patient reevaluated by SLP and upgraded to mechanical soft diet with thin liquids. Patient has no current complaints and is medically ready for discharge.     STROKE DOCUMENTATION   Acute Stroke Times   Last Known Normal Date: 06/15/19  Last Known Normal Time: 2330  Symptom Onset Date: 06/15/19  Symptom Onset Time: 2330  Stroke Team Called Date: 06/16/19  Stroke Team Called Time: 0105  Stroke Team Arrival Date: 06/16/19  Stroke Team Arrival Time: 0110  CT Interpretation Time: 0126   NIH Scale:  1a. Level of Consciousness: 0-->Alert, keenly responsive  1b. LOC  Questions: 0-->Answers both questions correctly  1c. LOC Commands: 0-->Performs both tasks correctly  2. Best Gaze: 0-->Normal  3. Visual: 0-->No visual loss  4. Facial Palsy: 1-->Minor paralysis (flattened nasolabial fold, asymmetry on smiling)  5a. Motor Arm, Left: 0-->No drift, limb holds 90 (or 45) degrees for full 10 secs  5b. Motor Arm, Right: 0-->No drift, limb holds 90 (or 45) degrees for full 10 secs  6a. Motor Leg, Left: 0-->No drift, leg holds 30 degree position for full 5 secs  6b. Motor Leg, Right: 0-->No drift, leg holds 30 degree position for full 5 secs  7. Limb Ataxia: 0-->Absent  8. Sensory: 0-->Normal, no sensory loss  9. Best Language: 0-->No aphasia, normal  10. Dysarthria: 1-->Mild-to-moderate dysarthria, patient slurs at least some words and, at worst, can be understood with some difficulty  11. Extinction and Inattention (formerly Neglect): 0-->No abnormality  Total (NIH Stroke Scale): 2  Modified [place] Score: 2  Baring Coma Scale:    ABCD2 Score:    [id number] Score:   HAS -BLED Score:   ICH Score:   Hunt & Brennan Classification:       Diagnostic Results      Brain Imaging      MRI brain w/o contrast 06/16/2019       Linear focus of acute infarction within the posterior right frontal lobe     CT head w/o contrast 06/16/2019     No evidence of acute territorial infarct, hemorrhage, mass effect, or midline shift.    Ventricles are normal in size and configuration.    No displaced calvarial fracture.    Visualized paranasal sinuses and mastoid air cells are clear.     Vessel Imaging      CTA head and neck 06/16/2019  No evidence of an intracranial high-grade stenosis or occlusion.      Cardiac Imaging      TTE 06/16/2019     · Normal left ventricular systolic function. The estimated ejection fraction is 65%  · Grade I (mild) left ventricular diastolic dysfunction consistent with impaired relaxation.  · Mild mitral sclerosis.  · Mild tricuspid regurgitation.  · Normal left atrial  pressure.  · Normal right ventricular systolic function.  · No wall motion abnormalities.  · Cannot rule out ASD, would repeat with bubble contrast if indicated.  · The estimated PA systolic pressure is 14 mm Hg  · Normal central venous pressure (3 mm Hg).     Left Ventricle Normal ejection fraction at 65%. Normal left ventricular cavity size. Normal wall thickness observed. Grade I (mild) left ventricular diastolic dysfunction consistent with impaired relaxation. Normal left atrial pressure. No wall motion abnormalities.   Right Ventricle Normal cavity size and ejection fraction.   Left Atrium The left atrium is normal. Left atrial volume index is 30.7 mL/m2.   Right Atrium There is normal right atrial size.   Aortic Valve The valve is trileaflet. There is mild annular calcification of the aortic valve present. There is normal leaflet mobility.   Mitral Valve Mild mitral sclerosis. There is mild mitral annular calcification. Normal leaflet mobility.   Tricuspid Valve The tricuspid valve is normal. Mild regurgitation. Mobility is normal. The estimated PA systolic pressure is 14 mmHg.   Pulmonic Valve Normal valve structure. Mobility is normal.   IVC/SVC Normal central venous pressure (3 mm Hg).   Ascending Aorta Ascending aorta upper limit of normal      Interventions: None     Complications: None     Disposition: Home or Self Care     Problems Resolved During this Admission:      * Cerebrovascular accident (CVA) due to thrombosis of right middle cerebral artery  59 y/o female with dysarthria and facial droop, NO TPA or IR  58 y.o. yo female with PMHx prior stroke and HTN who presented to [facility] [place]  with left facial droop and dysatrhria. LKN 2330 on 06/16. Patient evaluated by stroke HAKAN; tPA not given, as as low NIHSS-2. Pt was transferred to [facility] for stat CTA MP. Dr. Simon reviewed images as acquired. No LVO. Patient not a candidate for IR intervention. Admitted to Vascular Neurology for acute stroke workup.      MRI  completed on  showed e/o small acute R frontal cortical infarct. Etiology likely  vs ESUS.     Antithrombotics: DAPT     Statins:Lipitor 40 mg daily     Aggressive risk factor modification: HTN, Diet, Exercise, Obesity     Rehab efforts: The patient has been evaluated by a stroke team provider and the therapy needs have been fully considered based off the presenting complaints and exam findings. The following therapy evaluations are needed: SLP evaluate and treat- outpatient therapy     Diagnostics ordered/pending: none     VTE prophylaxis: Heparin 5000 units SQ every 8 hours     BP parameters: Infarct: No intervention, SBP <220     Facial droop  Due to stroke  Aggressive therapy     Dysarthria  Due to stroke  Aggressive therapy     Hypertension, essential  Stroke risk factor  SBP <220  home Norvasc restarted         Recommendations:      Post-discharge complication risks: None     Stroke Education given to: patient     Follow-up in Stroke Clinic in 4-6 weeks.     Discharge Plan:  Antithrombotics: Aspirin 81 mg  Statin: Atorvastatin 40 mg  Aggresive risk factor modification:  Hypertension      Past Medical History:   Diagnosis Date    Hypertension      Social History     Socioeconomic History    Marital status:      Spouse name: Not on file    Number of children: Not on file    Years of education: Not on file    Highest education level: Not on file   Occupational History    Not on file   Social Needs    Financial resource strain: Not on file    Food insecurity:     Worry: Not on file     Inability: Not on file    Transportation needs:     Medical: Not on file     Non-medical: Not on file   Tobacco Use    Smoking status: Never Smoker    Smokeless tobacco: Never Used   Substance and Sexual Activity    Alcohol use: Not on file    Drug use: Not on file    Sexual activity: Not on file   Lifestyle    Physical activity:     Days per week: Not on file     Minutes per session: Not on file  "   Stress: Not on file   Relationships    Social connections:     Talks on phone: Not on file     Gets together: Not on file     Attends Buddhism service: Not on file     Active member of club or organization: Not on file     Attends meetings of clubs or organizations: Not on file     Relationship status: Not on file   Other Topics Concern    Not on file   Social History Narrative    Not on file     No past surgical history on file.  No family history on file.    Review of Systems  General ROS: negative for chills, fever or weight loss  Psychological ROS: negative for hallucination, depression or suicidal ideation  Ophthalmic ROS: negative for blurry vision, photophobia or eye pain  ENT ROS: negative for epistaxis, sore throat or rhinorrhea  Respiratory ROS: no cough, shortness of breath, or wheezing  Cardiovascular ROS: no chest pain or dyspnea on exertion  Gastrointestinal ROS: no abdominal pain, change in bowel habits, or black/ bloody stools  Genito-Urinary ROS: no dysuria, trouble voiding, or hematuria  Musculoskeletal ROS: negative for gait disturbance or muscular weakness  Neurological ROS: no syncope or seizures; no ataxia  Dermatological ROS: negative for pruritis, rash and jaundice    Physical Exam:  BP (!) 149/89   Pulse 83   Ht 5' 4" (1.626 m)   Wt 86.2 kg (190 lb)   BMI 32.61 kg/m²   General appearance: alert, cooperative, no distress  Constitutional:Oriented to person, place, and time.appears well-developed and well-nourished.   HEENT: Normocephalic, atraumatic, neck symmetrical, no nasal discharge   Eyes: conjunctivae/corneas clear, PERRL, EOM's intact  Lungs: clear to auscultation bilaterally, no dullness to percussion bilaterally  Heart: regular rate and rhythm without rub; no displacement of the PMI   Abdomen: soft, non-tender; bowel sounds normoactive; no organomegaly  Extremities: extremities symmetric; no clubbing, cyanosis, or edema  Integument: Skin color, texture, turgor normal; no " rashes; hair distrubution normal  Neurologic:   Mental status: alert and awake, oriented x 4, comprehension, naming, and repetition intact. No right to left confusion. Performs serial 7's without difficulty .No dysarthria.  CN 2-12: pupils 4 mm bilaterally, reactive to light. Fundi without papilledema. Visual fields full to confrontation. EOM full without nystagmus. Face sensation normal in all distributions. Face symmetric. Hearing grossly intact. Palate elevates well. Tongue midline without atrophy or fasciculations.  Motor: 5/5 all over  Sensory: intact in all modalities.  DTR's: 2+ all over.  Plantars: no tested.  Coordination: finger to nose and heel-knee-shin intact.  Gait: no ataxia or bradykinesia    LABS:    Complete Blood Count  Lab Results   Component Value Date    RBC 4.67 06/17/2019    HGB 12.1 06/17/2019    HCT 38.3 06/17/2019    MCV 82 06/17/2019    MCH 25.9 (L) 06/17/2019    MCHC 31.6 (L) 06/17/2019    RDW 14.5 06/17/2019     06/17/2019    MPV 10.6 06/17/2019    GRAN 2.6 06/17/2019    GRAN 39.5 06/17/2019    LYMPH 2.9 06/17/2019    LYMPH 44.5 06/17/2019    MONO 0.8 06/17/2019    MONO 12.0 06/17/2019    EOS 0.2 06/17/2019    BASO 0.03 06/17/2019    EOSINOPHIL 3.2 06/17/2019    BASOPHIL 0.5 06/17/2019    DIFFMETHOD Automated 06/17/2019       Comprehensive Metabolic Panel  Lab Results   Component Value Date     (H) 06/17/2019    BUN 11 06/17/2019    CREATININE 0.7 06/17/2019     06/17/2019    K 3.4 (L) 06/17/2019     06/17/2019    PROT 7.3 06/17/2019    ALBUMIN 3.4 (L) 06/17/2019    BILITOT 0.5 06/17/2019    AST 17 06/17/2019    ALKPHOS 131 06/17/2019    CO2 23 06/17/2019    ALT 14 06/17/2019    ANIONGAP 13 06/17/2019    EGFRNONAA >60.0 06/17/2019    ESTGFRAFRICA >60.0 06/17/2019       TSH  Lab Results   Component Value Date    TSH 2.859 06/16/2019         Assessment: 57 y/o with HTN ,TIA in 2004, and history of small R frontal infarct. Stroke mechanism ESUS thus far.  Doing  well, NIHSS 0, Modified [place] 1.          ICD-10-CM ICD-9-CM    1. Cerebrovascular accident (CVA) due to embolism of right middle cerebral artery I63.411 434.11 TCD Emboli Detection w/ Intravenous     The encounter diagnosis was Cerebrovascular accident (CVA) due to embolism of right middle cerebral artery.      Plan:  1)  R frontal infarct: awaiting results 30 days event monitor. Reorder TCD-Bubble.  Continue ASA.   Stroke risk factors control.  2) HTN  3) TIA  Orders Placed This Encounter   Procedures    TCD Emboli Detection w/ Intravenous           Ashish Simon MD

## 2019-08-05 DIAGNOSIS — I63.429 CEREBROVASCULAR ACCIDENT (CVA) DUE TO EMBOLISM OF ANTERIOR CEREBRAL ARTERY, UNSPECIFIED BLOOD VESSEL LATERALITY: Primary | ICD-10-CM

## 2019-08-09 ENCOUNTER — HOSPITAL ENCOUNTER (OUTPATIENT)
Dept: RADIOLOGY | Facility: HOSPITAL | Age: 59
Discharge: HOME | End: 2019-08-09
Attending: PSYCHIATRY & NEUROLOGY
Payer: MEDICAID

## 2019-08-09 DIAGNOSIS — I63.429 CEREBROVASCULAR ACCIDENT (CVA) DUE TO EMBOLISM OF ANTERIOR CEREBRAL ARTERY, UNSPECIFIED BLOOD VESSEL LATERALITY: ICD-10-CM

## 2019-08-09 PROCEDURE — 93893 TCD STD ICR ART VEN-ART SHNT: CPT | Mod: TC

## 2019-08-09 PROCEDURE — 93893 US TCD ARTERIES EMBOLI DETECTION W/IV MICROBUBBLE INJ: ICD-10-PCS | Mod: 26,,, | Performed by: RADIOLOGY

## 2019-08-09 PROCEDURE — 93893 TCD STD ICR ART VEN-ART SHNT: CPT | Mod: 26,,, | Performed by: RADIOLOGY

## 2022-06-27 ENCOUNTER — OFFICE VISIT (OUTPATIENT)
Dept: URGENT CARE | Facility: CLINIC | Age: 62
End: 2022-06-27
Payer: MEDICAID

## 2022-06-27 VITALS
WEIGHT: 190 LBS | HEART RATE: 88 BPM | OXYGEN SATURATION: 95 % | SYSTOLIC BLOOD PRESSURE: 130 MMHG | TEMPERATURE: 98 F | HEIGHT: 64 IN | RESPIRATION RATE: 18 BRPM | DIASTOLIC BLOOD PRESSURE: 84 MMHG | BODY MASS INDEX: 32.44 KG/M2

## 2022-06-27 DIAGNOSIS — R05.9 COUGH: Primary | ICD-10-CM

## 2022-06-27 DIAGNOSIS — U07.1 COVID-19 VIRUS DETECTED: ICD-10-CM

## 2022-06-27 LAB
CTP QC/QA: YES
SARS-COV-2 RDRP RESP QL NAA+PROBE: POSITIVE

## 2022-06-27 PROCEDURE — 1160F PR REVIEW ALL MEDS BY PRESCRIBER/CLIN PHARMACIST DOCUMENTED: ICD-10-PCS | Mod: CPTII,S$GLB,,

## 2022-06-27 PROCEDURE — 3008F PR BODY MASS INDEX (BMI) DOCUMENTED: ICD-10-PCS | Mod: CPTII,S$GLB,,

## 2022-06-27 PROCEDURE — 99213 PR OFFICE/OUTPT VISIT, EST, LEVL III, 20-29 MIN: ICD-10-PCS | Mod: S$GLB,,,

## 2022-06-27 PROCEDURE — 3079F DIAST BP 80-89 MM HG: CPT | Mod: CPTII,S$GLB,,

## 2022-06-27 PROCEDURE — U0002: ICD-10-PCS | Mod: QW,S$GLB,,

## 2022-06-27 PROCEDURE — 1159F MED LIST DOCD IN RCRD: CPT | Mod: CPTII,S$GLB,,

## 2022-06-27 PROCEDURE — U0002 COVID-19 LAB TEST NON-CDC: HCPCS | Mod: QW,S$GLB,,

## 2022-06-27 PROCEDURE — 1159F PR MEDICATION LIST DOCUMENTED IN MEDICAL RECORD: ICD-10-PCS | Mod: CPTII,S$GLB,,

## 2022-06-27 PROCEDURE — 99213 OFFICE O/P EST LOW 20 MIN: CPT | Mod: S$GLB,,,

## 2022-06-27 PROCEDURE — 3079F PR MOST RECENT DIASTOLIC BLOOD PRESSURE 80-89 MM HG: ICD-10-PCS | Mod: CPTII,S$GLB,,

## 2022-06-27 PROCEDURE — 3075F PR MOST RECENT SYSTOLIC BLOOD PRESS GE 130-139MM HG: ICD-10-PCS | Mod: CPTII,S$GLB,,

## 2022-06-27 PROCEDURE — 1160F RVW MEDS BY RX/DR IN RCRD: CPT | Mod: CPTII,S$GLB,,

## 2022-06-27 PROCEDURE — 3075F SYST BP GE 130 - 139MM HG: CPT | Mod: CPTII,S$GLB,,

## 2022-06-27 PROCEDURE — 3008F BODY MASS INDEX DOCD: CPT | Mod: CPTII,S$GLB,,

## 2022-06-27 RX ORDER — PROMETHAZINE HYDROCHLORIDE AND DEXTROMETHORPHAN HYDROBROMIDE 6.25; 15 MG/5ML; MG/5ML
5 SYRUP ORAL EVERY 8 HOURS PRN
Qty: 118 ML | Refills: 0 | Status: SHIPPED | OUTPATIENT
Start: 2022-06-27 | End: 2022-07-07

## 2022-06-27 RX ORDER — BENZONATATE 100 MG/1
100 CAPSULE ORAL 3 TIMES DAILY PRN
Qty: 30 CAPSULE | Refills: 0 | Status: SHIPPED | OUTPATIENT
Start: 2022-06-27 | End: 2022-07-07

## 2022-06-27 RX ORDER — ANASTROZOLE 1 MG/1
1 TABLET ORAL
COMMUNITY
Start: 2021-12-27 | End: 2022-12-27

## 2022-06-27 NOTE — PROGRESS NOTES
Subjective:       Patient ID: Hortencia Cr is a 61 y.o. female.    Vitals:  vitals were not taken for this visit.     Chief Complaint: Cough    Pt stated that she has been sick for about 2 days , Pt stated that her granddaughter has covid and she watched her recently . Pt would like to be covid tested . Pts pharmacy has been updated .     Cough  This is a new problem. The current episode started in the past 7 days. The problem has been unchanged. The problem occurs every few minutes. The cough is non-productive. Associated symptoms include nasal congestion, postnasal drip and rhinorrhea. Pertinent negatives include no chest pain, chills, ear congestion, ear pain, fever, headaches, heartburn, hemoptysis, myalgias, rash, sore throat, shortness of breath, sweats, weight loss or wheezing. Nothing aggravates the symptoms. She has tried nothing for the symptoms. The treatment provided no relief.       Constitution: Negative for chills and fever.   HENT: Positive for postnasal drip. Negative for ear pain and sore throat.    Cardiovascular: Negative for chest pain.   Respiratory: Positive for cough. Negative for bloody sputum, shortness of breath and wheezing.    Gastrointestinal: Negative for heartburn.   Musculoskeletal: Negative for muscle ache.   Skin: Negative for rash.   Neurological: Negative for headaches.       Objective:      Physical Exam   Constitutional: She is oriented to person, place, and time. She appears well-developed. She is cooperative.  Non-toxic appearance. She does not appear ill. No distress.   HENT:   Head: Normocephalic and atraumatic.   Ears:   Right Ear: Hearing, tympanic membrane, external ear and ear canal normal.   Left Ear: Hearing, tympanic membrane, external ear and ear canal normal.   Nose: Nose normal. No mucosal edema, rhinorrhea or nasal deformity. No epistaxis. Right sinus exhibits no maxillary sinus tenderness and no frontal sinus tenderness. Left sinus exhibits no maxillary  sinus tenderness and no frontal sinus tenderness.   Mouth/Throat: Uvula is midline, oropharynx is clear and moist and mucous membranes are normal. No trismus in the jaw. Normal dentition. No uvula swelling. No oropharyngeal exudate, posterior oropharyngeal edema, posterior oropharyngeal erythema, tonsillar abscesses or cobblestoning. Tonsils are 1+ on the right. Tonsils are 1+ on the left. No tonsillar exudate.   Eyes: Conjunctivae and lids are normal. No scleral icterus.   Neck: Trachea normal and phonation normal. Neck supple. No edema present. No erythema present. No neck rigidity present.   Cardiovascular: Normal rate, regular rhythm, S1 normal, S2 normal, normal heart sounds and normal pulses.   Pulmonary/Chest: Effort normal and breath sounds normal. No accessory muscle usage or stridor. No apnea, no tachypnea and no bradypnea. No respiratory distress. She has no decreased breath sounds. She has no wheezes. She has no rhonchi. She has no rales.   Abdominal: Normal appearance.   Musculoskeletal: Normal range of motion.         General: No deformity. Normal range of motion.   Neurological: She is alert and oriented to person, place, and time. She exhibits normal muscle tone. Coordination normal.   Skin: Skin is warm, dry, intact, not diaphoretic and not pale.   Psychiatric: Her speech is normal and behavior is normal. Judgment and thought content normal.   Nursing note and vitals reviewed.        Results for orders placed or performed in visit on 06/27/22   POCT COVID-19 Rapid Screening   Result Value Ref Range    POC Rapid COVID Positive (A) Negative     Acceptable Yes        Assessment:       1. Cough          Plan:      Discussed positive COVID test results with PT. PT should Isolate for 5 days after symptoms start and then wear a mask for 5 days after when around people. Discussed is if condition worsens or fails to improve that PT receive another evaluation at the ER immediately or contact  your PCP to discuss concerns or return here. Reviewed was for development of SOB, CP, lightheadedness, dizziness, PT should go to ED. Also addressed is the use of Zyrtec, for congestion and sinus pressure. Also reviewed was the use of Flonase and to use as directed by medication label directs. Also discussed was rest and fluids as well as Tylenol or ibuprofen can also be used as directed for pain or fever. Warm salt water gargles discussed as well. Also discuss is the use of chloraseptic or cepacol for sore throat. Also addressed used of Promethazine DM for cough, and Tessalon to use as prescribed. PT given sedation warning for Promethazine Dm.  PT verbalized understanding and agreed with plan and diagnosis.  PT ambulatory from clinic NAD.     Cough  -     POCT COVID-19 Rapid Screening           Medical Decision Making:   Initial Assessment:   PT in room AAOX4, skin W/D, resp E/U, non toxic in appearance, NAD.  Urgent Care Management:  COVID risk score 2. PT offered Paxlovid but declined after risk and benefits were discussed.

## 2022-06-27 NOTE — LETTER
[address] [place] [address]  Phone: [phone]  Fax: [phone]          Return to Work/School    Patient: Hortencia Cr  YOB: 1960   Date: 06/27/2022     To Whom It May Concern:     Hortencia Cr was in contact with/seen in my office on 06/27/2022. COVID-19 is present in our communities across the state. There is limited testing for COVID at this time, so not all patients can be tested. In this situation, your employee meets the following criteria:     Hortencia Cr has met the criteria for COVID-19 testing and has a POSITIVE result. She can return to work once they are asymptomatic for 24 hours without the use of fever reducing medications AND at least five days from the start of symptoms (or from the first positive result if they have no symptoms). 06/29/2022     If you have any questions or concerns, or if I can be of further assistance, please do not hesitate to contact me.     Sincerely,    Edwin Sandra NP

## 2022-06-27 NOTE — PATIENT INSTRUCTIONS
COVID  If your condition worsens or fails to improve we recommend that you receive another evaluation at the ER immediately or contact your PCP to discuss your concerns or return here. You must understand that you've received an urgent care treatment only and that you may be released before all your medical problems are known or treated. -  Flonase (fluticasone) is a nasal spray which is available over the counter and may help with your symptoms   -  Zyrtec D, Claritin D or allegra D can also help with symptoms of congestion and drainage.   -  If you just have drainage you can take plain Zyrtec, Claritin or Allegra    -  Rest and fluids are also important.   -  Tylenol or ibuprofen can also be used as directed for pain unless you have an allergy to them or medical condition such as stomach ulcers, kidney or liver disease or blood thinners etc for which you should not be taking these type of medications.    -Take tessalon as prescribed.      Your test was POSITIVE for COVID-19 (coronavirus).       Please isolate yourself at home.  You may leave home and/or return to work once the following conditions are met:    If you were not hospitalized and are not moderately to severely immunocompromised:   More than 5 days since symptoms first appeared AND  More than 24 hours fever free without medications AND  Symptoms are improving  Continue to wear a mask around others for 5 additional days.    If you were hospitalized OR are moderately to severely immunocompromised:  More than 20 days since symptoms first appeared  More than 24 hours fever free without medications  Symptoms have improved    If you had no symptoms but tested positive:  More than 5 days since the date of the first positive test (20 days if moderately to severely immunocompromised). If you develop symptoms, then use the guidelines above.  Continue to wear a mask around others for 5 additional days.      Contact Tracing    As one of the next steps, you will  receive a call or text from the [place] Department of Health ([facility]) COVID-19 Tracing Team. See the contact information below so you know not to ignore the health departments call. It is important that you contact them back immediately so they can help.      Contact Tracer Number:  [phone]  Caller ID for most carriers: [place] Dept [facility]     What is contact tracing?  Contact tracing is a process that helps identify everyone who has been in close contact with an infected person. Contact tracers let those people know they may have been exposed and guide them on next steps. Confidentiality is important for everyone; no one will be told who may have exposed them to the virus.  Your involvement is important. The more we know about where and how this virus is spreading, the better chance we have at stopping it from spreading further.  What does exposure mean?  Exposure means you have been within 6 feet for more than 15 minutes with a person who has or had COVID-19.  What kind of questions do the contact tracers ask?  A contact tracer will confirm your basic contact information including name, address, phone number, and next of kin, as well as asking about any symptoms you may have had. Theyll also ask you how you think you may have gotten sick, such as places where you may have been exposed to the virus, and people you were with. Those names will never be shared with anyone outside of that call, and will only be used to help trace and stop the spread of the virus.   I have privacy concerns. How will the state use my information?  Your privacy about your health is important. All calls are completed using call centers that use the appropriate health privacy protection measures (HIPAA compliance), meaning that your patient information is safe. No one will ever ask you any questions related to immigration status. Your health comes first.   Do I have to participate?  You do not have to participate, but we strongly  encourage you to. Contact tracing can help us catch and control new outbreaks as theyre developing to keep your friends and family safe.   What if I dont hear from anyone?  If you dont receive a call within 24 hours, you can call the number above right away to inquire about your status. That line is open from 8:00 am - 8:00 p.m., 7 days a week.  Contact tracing saves lives! Together, we have the power to beat this virus and keep our loved ones and neighbors safe.    For more information see CDC link below.      https://www.cdc.gov/coronavirus/2019-ncov/hcp/guidance-prevent-spread.html#precautions        Sources:  [facility], [facility] and [facility]           Sincerely,     Edwin Sandra NP